# Patient Record
Sex: FEMALE | Race: BLACK OR AFRICAN AMERICAN
[De-identification: names, ages, dates, MRNs, and addresses within clinical notes are randomized per-mention and may not be internally consistent; named-entity substitution may affect disease eponyms.]

---

## 2017-04-04 ENCOUNTER — HOSPITAL ENCOUNTER (INPATIENT)
Dept: HOSPITAL 10 - MS4 | Age: 62
LOS: 3 days | Discharge: HOME | DRG: 194 | End: 2017-04-07
Attending: FAMILY MEDICINE | Admitting: FAMILY MEDICINE
Payer: COMMERCIAL

## 2017-04-04 VITALS — RESPIRATION RATE: 16 BRPM | SYSTOLIC BLOOD PRESSURE: 141 MMHG | DIASTOLIC BLOOD PRESSURE: 70 MMHG

## 2017-04-04 VITALS
HEIGHT: 64 IN | BODY MASS INDEX: 34.82 KG/M2 | BODY MASS INDEX: 34.82 KG/M2 | WEIGHT: 203.93 LBS | WEIGHT: 203.93 LBS | HEIGHT: 64 IN

## 2017-04-04 VITALS — DIASTOLIC BLOOD PRESSURE: 71 MMHG | SYSTOLIC BLOOD PRESSURE: 140 MMHG | RESPIRATION RATE: 19 BRPM

## 2017-04-04 VITALS — HEART RATE: 92 BPM

## 2017-04-04 VITALS — SYSTOLIC BLOOD PRESSURE: 156 MMHG | DIASTOLIC BLOOD PRESSURE: 75 MMHG | RESPIRATION RATE: 18 BRPM

## 2017-04-04 VITALS — SYSTOLIC BLOOD PRESSURE: 171 MMHG | HEART RATE: 90 BPM | DIASTOLIC BLOOD PRESSURE: 84 MMHG

## 2017-04-04 VITALS — HEART RATE: 81 BPM

## 2017-04-04 VITALS — HEART RATE: 95 BPM

## 2017-04-04 VITALS — HEART RATE: 102 BPM

## 2017-04-04 VITALS — DIASTOLIC BLOOD PRESSURE: 81 MMHG | RESPIRATION RATE: 18 BRPM | SYSTOLIC BLOOD PRESSURE: 181 MMHG

## 2017-04-04 VITALS — HEART RATE: 88 BPM

## 2017-04-04 VITALS — SYSTOLIC BLOOD PRESSURE: 148 MMHG | RESPIRATION RATE: 20 BRPM | DIASTOLIC BLOOD PRESSURE: 88 MMHG

## 2017-04-04 DIAGNOSIS — J18.9: Primary | ICD-10-CM

## 2017-04-04 DIAGNOSIS — Z79.4: ICD-10-CM

## 2017-04-04 DIAGNOSIS — D63.1: ICD-10-CM

## 2017-04-04 DIAGNOSIS — Z90.5: ICD-10-CM

## 2017-04-04 DIAGNOSIS — E11.22: ICD-10-CM

## 2017-04-04 DIAGNOSIS — Z85.528: ICD-10-CM

## 2017-04-04 DIAGNOSIS — M54.5: ICD-10-CM

## 2017-04-04 DIAGNOSIS — N18.4: ICD-10-CM

## 2017-04-04 DIAGNOSIS — I12.9: ICD-10-CM

## 2017-04-04 LAB
ADD SCAN DIFF: NO
BASOPHILS # BLD AUTO: 0 10^3/UL (ref 0–0.1)
BASOPHILS NFR BLD: 0.4 % (ref 0–2)
CK MB SERPL-MCNC: 1.07 NG/ML (ref 0–2.4)
CK MB SERPL-MCNC: 1.74 NG/ML (ref 0–2.4)
CK SERPL-CCNC: 139 IU/L (ref 23–200)
CK SERPL-CCNC: 185 IU/L (ref 23–200)
EOSINOPHIL # BLD: 0.1 10^3/UL (ref 0–0.5)
EOSINOPHIL NFR BLD: 1.4 % (ref 0–7)
ERYTHROCYTE [DISTWIDTH] IN BLOOD BY AUTOMATED COUNT: 13.2 % (ref 11.5–14.5)
HCT VFR BLD CALC: 35.7 % (ref 37–47)
HGB BLD-MCNC: 11 G/DL (ref 12–16)
LYMPHOCYTES # BLD AUTO: 2.4 10^3/UL (ref 0.8–2.9)
LYMPHOCYTES NFR BLD AUTO: 24.2 % (ref 15–51)
MCH RBC QN AUTO: 27.2 PG (ref 29–33)
MCHC RBC AUTO-ENTMCNC: 30.8 G/DL (ref 32–37)
MCV RBC AUTO: 88.4 FL (ref 82–101)
MONOCYTES # BLD: 0.7 10^3/UL (ref 0.3–0.9)
MONOCYTES NFR BLD: 6.7 % (ref 0–11)
NEUTROPHILS # BLD: 6.6 10^3/UL (ref 1.6–7.5)
NEUTROPHILS NFR BLD AUTO: 67 % (ref 39–77)
NRBC # BLD MANUAL: 0 10^3/UL (ref 0–0)
NRBC BLD QL: 0 /100WBC (ref 0–0)
PLATELET # BLD: 226 10^3/UL (ref 140–415)
PMV BLD AUTO: 11.5 FL (ref 7.4–10.4)
RBC # BLD AUTO: 4.04 10^6/UL (ref 4.2–5.4)
TROPONIN I SERPL-MCNC: < 0.012 NG/ML (ref 0–0.12)
TROPONIN I SERPL-MCNC: < 0.012 NG/ML (ref 0–0.12)
WBC # BLD AUTO: 9.9 10^3/UL (ref 4.8–10.8)

## 2017-04-04 PROCEDURE — C9113 INJ PANTOPRAZOLE SODIUM, VIA: HCPCS

## 2017-04-04 PROCEDURE — 80048 BASIC METABOLIC PNL TOTAL CA: CPT

## 2017-04-04 PROCEDURE — 83036 HEMOGLOBIN GLYCOSYLATED A1C: CPT

## 2017-04-04 PROCEDURE — 82553 CREATINE MB FRACTION: CPT

## 2017-04-04 PROCEDURE — 82550 ASSAY OF CK (CPK): CPT

## 2017-04-04 PROCEDURE — 97162 PT EVAL MOD COMPLEX 30 MIN: CPT

## 2017-04-04 PROCEDURE — 94664 DEMO&/EVAL PT USE INHALER: CPT

## 2017-04-04 PROCEDURE — 80076 HEPATIC FUNCTION PANEL: CPT

## 2017-04-04 PROCEDURE — 84484 ASSAY OF TROPONIN QUANT: CPT

## 2017-04-04 PROCEDURE — 82962 GLUCOSE BLOOD TEST: CPT

## 2017-04-04 PROCEDURE — 83735 ASSAY OF MAGNESIUM: CPT

## 2017-04-04 PROCEDURE — 85025 COMPLETE CBC W/AUTO DIFF WBC: CPT

## 2017-04-04 PROCEDURE — 94640 AIRWAY INHALATION TREATMENT: CPT

## 2017-04-04 PROCEDURE — 74176 CT ABD & PELVIS W/O CONTRAST: CPT

## 2017-04-04 PROCEDURE — 93005 ELECTROCARDIOGRAM TRACING: CPT

## 2017-04-04 RX ADMIN — MORPHINE SULFATE PRN MG: 2 INJECTION, SOLUTION INTRAMUSCULAR; INTRAVENOUS at 18:54

## 2017-04-04 RX ADMIN — OMEGA-3 FATTY ACIDS CAP DELAYED RELEASE 1000 MG SCH MG: 1000 CAPSULE DELAYED RELEASE at 09:00

## 2017-04-04 RX ADMIN — MORPHINE SULFATE PRN MG: 2 INJECTION, SOLUTION INTRAMUSCULAR; INTRAVENOUS at 14:57

## 2017-04-04 RX ADMIN — IPRATROPIUM BROMIDE AND ALBUTEROL SULFATE SCH ML: .5; 3 SOLUTION RESPIRATORY (INHALATION) at 09:39

## 2017-04-04 RX ADMIN — POLYETHYLENE GLYCOL 3350 SCH GM: 17 POWDER, FOR SOLUTION ORAL at 09:00

## 2017-04-04 RX ADMIN — DIPHENHYDRAMINE HYDROCHLORIDE PRN MG: 50 INJECTION, SOLUTION INTRAMUSCULAR; INTRAVENOUS at 10:31

## 2017-04-04 RX ADMIN — MORPHINE SULFATE PRN MG: 2 INJECTION, SOLUTION INTRAMUSCULAR; INTRAVENOUS at 05:54

## 2017-04-04 RX ADMIN — FOLIC ACID SCH MLS/HR: 5 INJECTION, SOLUTION INTRAMUSCULAR; INTRAVENOUS; SUBCUTANEOUS at 12:56

## 2017-04-04 RX ADMIN — IPRATROPIUM BROMIDE AND ALBUTEROL SULFATE SCH ML: .5; 3 SOLUTION RESPIRATORY (INHALATION) at 15:36

## 2017-04-04 RX ADMIN — HYDRALAZINE HYDROCHLORIDE PRN MG: 20 INJECTION INTRAMUSCULAR; INTRAVENOUS at 11:32

## 2017-04-04 RX ADMIN — IPRATROPIUM BROMIDE AND ALBUTEROL SULFATE SCH ML: .5; 3 SOLUTION RESPIRATORY (INHALATION) at 20:07

## 2017-04-04 RX ADMIN — ENOXAPARIN SODIUM SCH MG: 100 INJECTION SUBCUTANEOUS at 09:04

## 2017-04-04 RX ADMIN — MORPHINE SULFATE PRN MG: 2 INJECTION, SOLUTION INTRAMUSCULAR; INTRAVENOUS at 23:55

## 2017-04-04 RX ADMIN — CEFTRIAXONE SCH MLS/HR: 1 INJECTION, SOLUTION INTRAVENOUS at 07:59

## 2017-04-04 RX ADMIN — DEXAMETHASONE SODIUM PHOSPHATE PRN MG: 10 INJECTION, SOLUTION INTRAMUSCULAR; INTRAVENOUS at 11:32

## 2017-04-04 RX ADMIN — ATORVASTATIN CALCIUM SCH MG: 40 TABLET, FILM COATED ORAL at 20:16

## 2017-04-04 RX ADMIN — PANTOPRAZOLE SODIUM SCH MG: 40 TABLET, DELAYED RELEASE ORAL at 06:06

## 2017-04-04 RX ADMIN — FOLIC ACID SCH MLS/HR: 5 INJECTION, SOLUTION INTRAMUSCULAR; INTRAVENOUS; SUBCUTANEOUS at 20:25

## 2017-04-04 RX ADMIN — IPRATROPIUM BROMIDE AND ALBUTEROL SULFATE SCH ML: .5; 3 SOLUTION RESPIRATORY (INHALATION) at 12:00

## 2017-04-04 RX ADMIN — MORPHINE SULFATE PRN MG: 2 INJECTION, SOLUTION INTRAMUSCULAR; INTRAVENOUS at 10:31

## 2017-04-04 RX ADMIN — INSULIN GLARGINE SCH UNIT: 100 INJECTION, SOLUTION SUBCUTANEOUS at 20:32

## 2017-04-04 RX ADMIN — AMLODIPINE BESYLATE SCH MG: 10 TABLET ORAL at 09:00

## 2017-04-04 RX ADMIN — DIPHENHYDRAMINE HYDROCHLORIDE PRN MG: 50 INJECTION, SOLUTION INTRAMUSCULAR; INTRAVENOUS at 06:01

## 2017-04-04 RX ADMIN — PANTOPRAZOLE SODIUM SCH MG: 40 TABLET, DELAYED RELEASE ORAL at 06:00

## 2017-04-04 RX ADMIN — AZITHROMYCIN MONOHYDRATE SCH MLS/HR: 500 INJECTION, POWDER, LYOPHILIZED, FOR SOLUTION INTRAVENOUS at 08:58

## 2017-04-04 RX ADMIN — POLYETHYLENE GLYCOL 3350 SCH GM: 17 POWDER, FOR SOLUTION ORAL at 20:16

## 2017-04-04 NOTE — HP
Date/Time of Note


Date/Time of Note


DATE: 4/4/17 


TIME: 04:55





Assessment/Plan


VTE Prophylaxis


VTE Prophylaxis Intervention:  anti-embolic stocking





Lines/Catheters


IV Catheter Type (from Nrsg):  PORT A CATH


Urinary Cath still in place:  No





Assessment/Plan


Assessment/Plan


1)  Pneumonia, LLL


- Admit to Med Surg


-  IV Antibiotics





HPI/ROS


Admit Date/Time


Admit Date/Time


Apr 4, 2017 at 02:39





Hx of Present Illness


Patient is transferred to us as a Direct Admit from the ER at [facility] due to 

Insurance reasons. There, she was diagnosed with a LLL Pneumonia. She presented 

with a cough and chest discomfort. Patient tells me that her pain started in 

her mid-back on the left and then wrapped around to the front. The pain is 

constant and is exacerbated by taking moderate to deep breaths. There, she 

received IV fluid bolus, Rocephin and Zithromax. Urine and blood cultures are 

pending. She also required 2 doses of Morphine to control the pain, and since 

the morphine caused some itching, she was also given Benadryl IV.





Of note is that she has a history of Renal Cell Carcinoma and underwent a right 

Nephrectomy about 7 years ago, and a left Partial Nephrectomy 3 years ago. She 

did not require XRT or Chemotherapy





No history of Pneumonia.





Pertinent Labs and Rads from [facility]:


UA: 1.021, 6.0, Glu 3+, Prot 3+, others NEG/WNL


Urine Micro: Epi: 1, RBC: 4, WBC: 20; Hyalin Casts: 0 - 2





INR : 1.0





Trop I < 0.04


BNP: 126





WBC:     10.11 with normal differential


H/H:       11.5/36.8 with normal indices


PLT:       Unable toreport - clumping, possible EDTA induced platelet clumping





Na+:     136


K+        5.0


Cl-        106


CO2:     20


Glu:      314


BUN/Cr:     33/2.27


eGFR:   23


Ca+:     8.7





CXR - compared to 8/13/16


IMPRESSION:  Left costophrenic angle is out of view


                       Opacity in the left lung base obscuring the left cardiac 

border may represent a lingular infiltrate, pneumonia not excluded


                       Right Port-A-Cath tip is in the SVC


                       Bones are intact





ROS


General:  Admits:  Feels generally tired


              Denies:  Fever, Chills, Poor Appetite, Generalized Body Aches


Eyes:  Admits:


          Denies:  Blurry Vision, Double Vision


HENT:  Admits:


           Denies:  Ear Pain/Pressure, Runny/Stuffy Nose, Sore Throat


Cardiovascular:  Admits:  Chest Pain/Pleuritic Chest Pain


                        Denies:  Palpitations, Leg Swelling


Pulmonary:  Admits:  Occasional Cough


                  Denies:  Wheeze, Shortness of Breath


Gastrointestinal:  Admits:


                          Denies:  Abdominal Pain, Nausea, Vomiting, Diarrhea, 

Blood in Stool, Black-Colored Stool


Urogenital:  Admits:  Discomfort with Urination, Urinary Frequency, 


                  Denies:  Blood in Urine


Musculoskeletal:  Admits:


                          Denies:  Joint Pain, Joint Swelling, Muscle Pain


Neurological:  Admits:


                     Denies:  Headache, Dizziness, Numbness, Tingling, Shooting 

Pains


Integumentary:  Admits:


                         Denies:  Rash, Itch


Endocrine:  Admits:  HgbA1c used to be 13. Las she knows it was down to 9





PMH/Family/Social


Past Medical History


Renal Cell Carcinoma - bilateral


DM Type 2





Past Surgical History


Right Nephrectomy ~ 2010


Left Partial Nephrectomy ~ 2014


Past Surgical Hx:  other





Social History


Smoking Status:  Never smoker





Exam/Review of Systems


Vital Signs


Vitals





 Vital Signs








  Date Time  Temp Pulse Resp B/P Pulse Ox O2 Delivery O2 Flow Rate FiO2


 


4/4/17 04:10  81      


 


4/4/17 02:45      Nasal Cannula 2.0 











Exam


Exam


General:  Overweight female. Alert and oriented, in no acute distress. Appears 

tired. Non-toxic.


Eyes:  Sclera White, EOMI


HENT:  Normocephalic/Atraumatic, External Ears/Nose Normal, Moist Mucus 

Membranes


Neck:  Supple, Trachea Midline


Cardiovascular:  Normal Rate, Normal Rhythm, Normal S1 and S2, No Murmur, No 

Extra Sounds


Pulmonary:  Decreased airflow throughout, Normal Respiratory Effort, No Rales, 

Rhonchi or Wheezes, but pain on left side when she takes a deep breath


Gastrointestinal:  Normoactive Bowel Sounds, Soft, Non-Tender/Non-Distended, No 

Hepatosplenomegaly Appreciated, No Pulsatile Masses


Urogenital:  Deferred


Musculoskeletal:  Normal Muscle Bulk and Tone


Neurological:  CN  II - XII Grossly Intact, Non-Focal, Speech Normal


Integumentary:  Normal Moisture and Temperature, Good Turgor, No Jaundice, No 

Rash


Lymphatic:  No Cervical Lymphadenopathy


Psychiatric:  Appropriate Mood and Affect, Good Eye Contact











MARIELA MARSH DO Apr 4, 2017 04:59

## 2017-04-05 VITALS — DIASTOLIC BLOOD PRESSURE: 66 MMHG | SYSTOLIC BLOOD PRESSURE: 132 MMHG | RESPIRATION RATE: 20 BRPM

## 2017-04-05 VITALS — HEART RATE: 98 BPM

## 2017-04-05 VITALS — SYSTOLIC BLOOD PRESSURE: 180 MMHG | DIASTOLIC BLOOD PRESSURE: 79 MMHG | RESPIRATION RATE: 18 BRPM | HEART RATE: 94 BPM

## 2017-04-05 VITALS — SYSTOLIC BLOOD PRESSURE: 155 MMHG | RESPIRATION RATE: 16 BRPM | DIASTOLIC BLOOD PRESSURE: 68 MMHG

## 2017-04-05 VITALS — HEART RATE: 117 BPM

## 2017-04-05 VITALS — HEART RATE: 107 BPM

## 2017-04-05 VITALS — SYSTOLIC BLOOD PRESSURE: 179 MMHG | DIASTOLIC BLOOD PRESSURE: 77 MMHG | RESPIRATION RATE: 20 BRPM

## 2017-04-05 VITALS — SYSTOLIC BLOOD PRESSURE: 148 MMHG | RESPIRATION RATE: 18 BRPM | DIASTOLIC BLOOD PRESSURE: 70 MMHG

## 2017-04-05 VITALS — RESPIRATION RATE: 16 BRPM | DIASTOLIC BLOOD PRESSURE: 73 MMHG | SYSTOLIC BLOOD PRESSURE: 155 MMHG

## 2017-04-05 VITALS — RESPIRATION RATE: 16 BRPM | DIASTOLIC BLOOD PRESSURE: 70 MMHG | SYSTOLIC BLOOD PRESSURE: 145 MMHG

## 2017-04-05 VITALS — HEART RATE: 103 BPM

## 2017-04-05 VITALS — HEART RATE: 90 BPM

## 2017-04-05 VITALS — HEART RATE: 108 BPM

## 2017-04-05 LAB
ADD SCAN DIFF: NO
ALBUMIN SERPL-MCNC: 3.1 G/DL (ref 3.3–4.9)
ALP SERPL-CCNC: 101 IU/L (ref 42–121)
ALT SERPL-CCNC: 21 IU/L (ref 13–69)
ANION GAP SERPL CALC-SCNC: 15 MMOL/L (ref 8–16)
AST SERPL-CCNC: 29 IU/L (ref 15–46)
BASOPHILS # BLD AUTO: 0.1 10^3/UL (ref 0–0.1)
BASOPHILS NFR BLD: 0.5 % (ref 0–2)
BILIRUB DIRECT SERPL-MCNC: 0 MG/DL (ref 0–0.2)
BILIRUB SERPL-MCNC: 0 MG/DL (ref 0.2–1.3)
BUN SERPL-MCNC: 30 MG/DL (ref 7–20)
CALCIUM SERPL-MCNC: 8.6 MG/DL (ref 8.4–10.2)
CHLORIDE SERPL-SCNC: 111 MMOL/L (ref 97–110)
CO2 SERPL-SCNC: 21 MMOL/L (ref 21–31)
CREAT SERPL-MCNC: 2.74 MG/DL (ref 0.44–1)
EOSINOPHIL # BLD: 0.2 10^3/UL (ref 0–0.5)
EOSINOPHIL NFR BLD: 2.5 % (ref 0–7)
ERYTHROCYTE [DISTWIDTH] IN BLOOD BY AUTOMATED COUNT: 13.7 % (ref 11.5–14.5)
GLUCOSE SERPL-MCNC: 153 MG/DL (ref 70–220)
HCT VFR BLD CALC: 33.8 % (ref 37–47)
HGB BLD-MCNC: 9.9 G/DL (ref 12–16)
LYMPHOCYTES # BLD AUTO: 3.2 10^3/UL (ref 0.8–2.9)
LYMPHOCYTES NFR BLD AUTO: 32.9 % (ref 15–51)
MAGNESIUM SERPL-MCNC: 1.6 MG/DL (ref 1.7–2.5)
MCH RBC QN AUTO: 26.7 PG (ref 29–33)
MCHC RBC AUTO-ENTMCNC: 29.3 G/DL (ref 32–37)
MCV RBC AUTO: 91.1 FL (ref 82–101)
MONOCYTES # BLD: 0.7 10^3/UL (ref 0.3–0.9)
MONOCYTES NFR BLD: 7.4 % (ref 0–11)
NEUTROPHILS # BLD: 5.4 10^3/UL (ref 1.6–7.5)
NEUTROPHILS NFR BLD AUTO: 56.5 % (ref 39–77)
NRBC # BLD MANUAL: 0 10^3/UL (ref 0–0)
NRBC BLD QL: 0 /100WBC (ref 0–0)
PLATELET # BLD: 157 10^3/UL (ref 140–415)
PMV BLD AUTO: 10.7 FL (ref 7.4–10.4)
POTASSIUM SERPL-SCNC: 4.3 MMOL/L (ref 3.5–5.1)
PROT SERPL-MCNC: 6.7 G/DL (ref 6.1–8.1)
RBC # BLD AUTO: 3.71 10^6/UL (ref 4.2–5.4)
SODIUM SERPL-SCNC: 143 MMOL/L (ref 135–144)
WBC # BLD AUTO: 9.6 10^3/UL (ref 4.8–10.8)

## 2017-04-05 RX ADMIN — IPRATROPIUM BROMIDE AND ALBUTEROL SULFATE SCH ML: .5; 3 SOLUTION RESPIRATORY (INHALATION) at 07:46

## 2017-04-05 RX ADMIN — IPRATROPIUM BROMIDE AND ALBUTEROL SULFATE SCH ML: .5; 3 SOLUTION RESPIRATORY (INHALATION) at 20:03

## 2017-04-05 RX ADMIN — MORPHINE SULFATE PRN MG: 2 INJECTION, SOLUTION INTRAMUSCULAR; INTRAVENOUS at 04:17

## 2017-04-05 RX ADMIN — FOLIC ACID SCH MLS/HR: 5 INJECTION, SOLUTION INTRAMUSCULAR; INTRAVENOUS; SUBCUTANEOUS at 12:08

## 2017-04-05 RX ADMIN — MORPHINE SULFATE PRN MG: 2 INJECTION, SOLUTION INTRAMUSCULAR; INTRAVENOUS at 18:11

## 2017-04-05 RX ADMIN — POLYETHYLENE GLYCOL 3350 SCH GM: 17 POWDER, FOR SOLUTION ORAL at 08:39

## 2017-04-05 RX ADMIN — ENOXAPARIN SODIUM SCH MG: 100 INJECTION SUBCUTANEOUS at 08:38

## 2017-04-05 RX ADMIN — HEPARIN SODIUM SCH UNIT: 5000 INJECTION, SOLUTION INTRAVENOUS; SUBCUTANEOUS at 20:31

## 2017-04-05 RX ADMIN — PANTOPRAZOLE SODIUM SCH MG: 40 TABLET, DELAYED RELEASE ORAL at 05:06

## 2017-04-05 RX ADMIN — ATORVASTATIN CALCIUM SCH MG: 40 TABLET, FILM COATED ORAL at 20:23

## 2017-04-05 RX ADMIN — OMEGA-3 FATTY ACIDS CAP DELAYED RELEASE 1000 MG SCH MG: 1000 CAPSULE DELAYED RELEASE at 08:39

## 2017-04-05 RX ADMIN — IPRATROPIUM BROMIDE AND ALBUTEROL SULFATE SCH ML: .5; 3 SOLUTION RESPIRATORY (INHALATION) at 13:01

## 2017-04-05 RX ADMIN — MORPHINE SULFATE PRN MG: 2 INJECTION, SOLUTION INTRAMUSCULAR; INTRAVENOUS at 08:29

## 2017-04-05 RX ADMIN — CEFTRIAXONE SCH MLS/HR: 1 INJECTION, SOLUTION INTRAVENOUS at 06:30

## 2017-04-05 RX ADMIN — FOLIC ACID SCH MLS/HR: 5 INJECTION, SOLUTION INTRAMUSCULAR; INTRAVENOUS; SUBCUTANEOUS at 20:19

## 2017-04-05 RX ADMIN — AMLODIPINE BESYLATE SCH MG: 10 TABLET ORAL at 08:39

## 2017-04-05 RX ADMIN — INSULIN GLARGINE SCH UNIT: 100 INJECTION, SOLUTION SUBCUTANEOUS at 20:27

## 2017-04-05 RX ADMIN — AZITHROMYCIN MONOHYDRATE SCH MLS/HR: 500 INJECTION, POWDER, LYOPHILIZED, FOR SOLUTION INTRAVENOUS at 10:57

## 2017-04-05 RX ADMIN — IPRATROPIUM BROMIDE AND ALBUTEROL SULFATE SCH ML: .5; 3 SOLUTION RESPIRATORY (INHALATION) at 16:32

## 2017-04-05 RX ADMIN — MORPHINE SULFATE PRN MG: 2 INJECTION, SOLUTION INTRAMUSCULAR; INTRAVENOUS at 22:02

## 2017-04-05 RX ADMIN — HYDRALAZINE HYDROCHLORIDE PRN MG: 20 INJECTION INTRAMUSCULAR; INTRAVENOUS at 08:29

## 2017-04-05 RX ADMIN — POLYETHYLENE GLYCOL 3350 SCH GM: 17 POWDER, FOR SOLUTION ORAL at 20:23

## 2017-04-05 RX ADMIN — DEXAMETHASONE SODIUM PHOSPHATE PRN MG: 10 INJECTION, SOLUTION INTRAMUSCULAR; INTRAVENOUS at 08:28

## 2017-04-05 RX ADMIN — MORPHINE SULFATE PRN MG: 2 INJECTION, SOLUTION INTRAMUSCULAR; INTRAVENOUS at 14:08

## 2017-04-05 RX ADMIN — FOLIC ACID SCH MLS/HR: 5 INJECTION, SOLUTION INTRAMUSCULAR; INTRAVENOUS; SUBCUTANEOUS at 04:39

## 2017-04-05 NOTE — RADRPT
PROCEDURE:   CT abdomen and pelvis without contrast. 

 

CLINICAL INDICATION:   Left flank pain status post right nephrectomy.

 

TECHNIQUE:   Noncontrast CT examination of the abdomen and pelvis, with axial, sagittal and coronal 
reformatted images.

CTDI: 20.96  mGy and DLP: 1163.06 mGy-cm.

 

COMPARISON:  CT abdomen and pelvis dated 09/13/2016.

 

FINDINGS:

 

CT abdomen:

The lung bases are clear.  The heart size is normal, without pericardial thickening or effusion.  

 

No noncontrast attenuation of the liver is compatible with fatty infiltration. The spleen is normal 
in size and homogeneous in density.  The stomach is partially collapsed, but is grossly unremarkable
.  The pancreas as visualized is normal.  The gallbladder is surgically absent; and the biliary tree
 is otherwise unremarkable and there is no evidence for biliary dilatation.  The adrenal glands are 
symmetric and normal. Surgical changes of right nephrectomy.  The surgical bed is clear.  Small cyst
s likely present in left kidney, some of which are hemorrhagic. No renal calculus or obstructive uro
chnace or mass lesion is seen.

 

The aorta is of normal caliber.  Aortic vascular calcifications are present.  There is no retroperit
anthony lymphadenopathy.  The marline hepatis region is clear.  The bowel and mesentery, as visualized, 
are equally unremarkable.

 

CT pelvis:

The small bowel loops situated within the pelvis are unremarkable.  The pelvic organs are normal.  T
he pelvic sidewalls and inguinal regions are clear.  The sigmoid colon and rectum are all unremarkab
le.  No mass, lymphadenopathy, or free fluid is seen.  No acute inflammation is seen.  The appendix 
is unremarkable.

 

The surrounding osseous structures are remarkable for mild degenerative spondylosis of the spine.  N
o osteolytic or osteoblastic lesion is detected.  

 

IMPRESSION:

1.  Surgically absent right kidney.

2.  Likely small cysts in the left kidney, and consider ultrasound correlation.

3.  Fatty infiltration of the liver.

4.  Surgically absent gallbladder.

 

RPTAT: UU

_____________________________________________ 

Physician Emelia           Date    Time 

Electronically viewed and signed by Physician Emelia on 04/05/2017 01:37 

 

D:  04/05/2017 01:37  T:  04/05/2017 01:37

RS/

## 2017-04-05 NOTE — PN
Date/Time of Note


Date/Time of Note


DATE: 4/5/17 


TIME: 13:28





Assessment/Plan


VTE Prophylaxis


VTE Prophylaxis Intervention:  LMWH





Lines/Catheters


IV Catheter Type (from Nrsg):  PORTACATH


Urinary Cath still in place:  No





Assessment/Plan


Assessment/Plan


1. LLL pneumonia, on rocephin and zithromax


2. DM, on insulin


3. Hypertension, on hydralazine


4. Renal cell carcinoma, right total and left partial nephrectomy


5. 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


6. Normocytic anemia, CKD related


7. DVT prophylaxis:heparin





Subjective


24 Hr Interval Summary


Free Text/Dictation


afebrile


still has cough and chest pain from coughing





Exam/Review of Systems


Vital Signs


Vitals





 Vital Signs








  Date Time  Temp Pulse Resp B/P Pulse Ox O2 Delivery O2 Flow Rate FiO2


 


4/5/17 13:01  88 16  94   21


 


4/5/17 12:12 98.0   132/66    


 


4/5/17 08:00      Room Air  


 


4/4/17 02:45       2.0 














 Intake and Output   


 


 4/4/17 4/4/17 4/5/17





 15:00 23:00 07:00


 


Intake Total  400 ml 1200 ml


 


Balance  400 ml 1200 ml











Exam


Constitutional:  alert, oriented, well developed


Psych:  nl mood/affect, no complaints


Head:  atraumatic, normocephalic


Eyes:  EOMI, PERRL, nl conjunctiva, nl lids


ENMT:  nl external ears & nose, nl lips & teeth, nl nasal mucosa & septum


Neck:  non-tender, supple


Respiratory:  clear to auscultation, normal air movement, 


   No congested cough, No crackles/rales, No diminished breath sounds, No 

intercostal retraction, No labored breathing, No other, No respirations, No 

tactile fremitus, No wheezing


Cardiovascular:  nl pulses, regular rate and rhythm, 


   No S3, No S4, No bruits, No diastolic murmur, No edema, No gallop, No 

irregular rhythm, No jugular venous distention (JVD), No murmurs/extra sounds, 

No other, No rub, No systolic murmur


Gastrointestinal:  nl liver, spleen, non-tender, soft, 


   No ascites, No bowel sounds, No distended, No firm, No hepatomegaly, No mass

, No other, No rebound or guarding, No splenomegaly, No surgical scars, No 

tender


Musculoskeletal:  nl extremities to inspection


Extremities:  normal pulses, 


   No calf tenderness, No clubbing, No cyanosis, No edema, No other, No 

palpable cord, No pitting pedal edema, No tenderness


Neurological:  CNS II-XII intact, nl mental status, nl speech


Skin:  nl turgor


Lymph:  nl lymph nodes





Results


Result Diagram:  


4/5/17 0540                                                                    

            4/5/17 0540





Results 24 hrs





Laboratory Tests








Test


  4/4/17


17:21 4/4/17


20:23 4/5/17


05:40 4/5/17


08:24


 


Bedside Glucose 175   197    131  


 


White Blood Count   9.6   


 


Red Blood Count   3.71  L 


 


Hemoglobin   9.9  L 


 


Hematocrit   33.8  L 


 


Mean Corpuscular Volume   91.1   


 


Mean Corpuscular Hemoglobin   26.7  L 


 


Mean Corpuscular Hemoglobin


Concent 


  


  29.3  L


  


 


 


Red Cell Distribution Width   13.7   


 


Platelet Count   157  # 


 


Mean Platelet Volume   10.7  H 


 


Neutrophils %   56.5   


 


Lymphocytes %   32.9   


 


Monocytes %   7.4   


 


Eosinophils %   2.5   


 


Basophils %   0.5   


 


Nucleated Red Blood Cells %   0.0   


 


Neutrophils #   5.4   


 


Lymphocytes #   3.2  H 


 


Monocytes #   0.7   


 


Eosinophils #   0.2   


 


Basophils #   0.1   


 


Nucleated Red Blood Cells #   0.0   


 


Sodium Level   143   


 


Potassium Level   4.3   


 


Chloride Level   111  H 


 


Carbon Dioxide Level   21   


 


Anion Gap   15   


 


Blood Urea Nitrogen   30  H 


 


Creatinine   2.74  H 


 


Glucose Level   153   


 


Hemoglobin A1c   11.4  H 


 


Calcium Level   8.6   


 


Magnesium Level   1.6  L 


 


Total Bilirubin   0.0  L 


 


Direct Bilirubin   0.00   


 


Indirect Bilirubin   0.0   


 


Aspartate Amino Transf


(AST/SGOT) 


  


  29  


  


 


 


Alanine Aminotransferase


(ALT/SGPT) 


  


  21  


  


 


 


Alkaline Phosphatase   101   


 


Total Protein   6.7   


 


Albumin   3.1  L 














Test


  4/5/17


11:20 


  


  


 


 


Bedside Glucose 161     











Medications


Medications





 Current Medications


Nitroglycerin (Nitroglycerin (Sl Tab) 0.4 Mg) 1 tab Q5M  PRN SL CHEST PAIN;  

Start 4/4/17 at 05:30


Acetaminophen (Tylenol Tab) 650 mg Q6H  PRN PO PAIN LEVEL 1-3 OR FEVER;  Start 4 /4/17 at 05:30


Acetaminophen/ Hydrocodone Bitart (Norco (5/325)) 1 tab Q6H  PRN PO PAIN LEVEL 4

-6;  Start 4/4/17 at 05:30


Morphine Sulfate (morphine) 2 mg Q4H  PRN IV PAIN LEVEL 7-10 Last administered 

on 4/5/17at 08:29; Admin Dose 2 MG;  Start 4/4/17 at 05:30


Enoxaparin Sodium (Lovenox) 30 mg DAILY SC  Last administered on 4/5/17at 08:38

; Admin Dose 30 MG;  Start 4/4/17 at 09:00


Amlodipine Besylate (Norvasc) 10 mg DAILY PO ;  Start 4/4/17 at 09:00


Atorvastatin Calcium (Lipitor) 40 mg HS PO ;  Start 4/4/17 at 21:00


Carvedilol (Coreg) 12.5 mg BID PO ;  Start 4/4/17 at 09:00


Hydralazine HCl (Apresoline) 25 mg TID PO ;  Start 4/4/17 at 09:00


Insulin Glargine (Lantus) 20 unit HS SC  Last administered on 4/4/17at 20:32; 

Admin Dose 20 UNIT;  Start 4/4/17 at 21:00


Fish Oil (Fish Oil) 1,000 mg DAILY PO ;  Start 4/4/17 at 09:00


Polyethylene Glycol (Miralax) 17 gm BID PO ;  Start 4/4/17 at 09:00


Diagnostic Test (Pha) 1 ea 1 ea 02 XX ;  Start 4/5/17 at 02:00


Ceftriaxone Sodium 50 ml @  100 mls/hr Q24H IVPB  Last administered on 4/5/17at 

06:30; Admin Dose 100 MLS/HR;  Start 4/4/17 at 06:30


Azithromycin/ Sodium Chloride (Zithromax/NS) 250 ml @  250 mls/hr DAILY IVPB  

Last administered on 4/5/17at 10:57; Admin Dose 250 MLS/HR;  Start 4/4/17 at 09:

00


Miscellaneous Information 1 ea NOTE XX ;  Start 4/4/17 at 06:30


Glucose (Glutose) 15 gm Q15M  PRN PO DECREASED GLUCOSE;  Start 4/4/17 at 06:30


Glucose (Glutose) 22.5 gm Q15M  PRN PO DECREASED GLUCOSE;  Start 4/4/17 at 06:30


Dextrose (D50w Syringe) 25 ml Q15M  PRN IV DECREASED GLUCOSE;  Start 4/4/17 at 

06:30


Dextrose (D50w Syringe) 50 ml Q15M  PRN IV DECREASED GLUCOSE;  Start 4/4/17 at 

06:30


Glucagon (Glucagen) 1 mg Q15M  PRN IM DECREASED GLUCOSE;  Start 4/4/17 at 06:30


Glucose (Glutose) 15 gm Q15M  PRN BUCCAL DECREASED GLUCOSE;  Start 4/4/17 at 06:

30


Hydralazine HCl (Apresoline) 10 mg Q6H  PRN IV ELEVATED BLOOD PRESSURE Last 

administered on 4/5/17at 08:29; Admin Dose 10 MG;  Start 4/4/17 at 11:00


Ondansetron HCl (Zofran Inj) 4 mg Q4H  PRN IV NAUSEA AND/OR VOMITING Last 

administered on 4/5/17at 08:28; Admin Dose 4 MG;  Start 4/4/17 at 11:00


Pantoprazole 40 mg 40 mg DAILY@06 PO ;  Start 4/5/17 at 06:00


Sodium Chloride (NS) 1,000 ml @  125 mls/hr Q8H IV  Last administered on 4/5/ 17at 12:08; Admin Dose 125 MLS/HR;  Start 4/4/17 at 13:00


Diphenhydramine HCl (Benadryl) 25 mg Q8H  PRN PO ITCHING;  Start 4/4/17 at 15:30











JOSELITO GARCIA MD Apr 5, 2017 13:35

## 2017-04-06 VITALS — DIASTOLIC BLOOD PRESSURE: 58 MMHG | SYSTOLIC BLOOD PRESSURE: 122 MMHG | RESPIRATION RATE: 18 BRPM

## 2017-04-06 VITALS — HEART RATE: 97 BPM | DIASTOLIC BLOOD PRESSURE: 88 MMHG | SYSTOLIC BLOOD PRESSURE: 138 MMHG

## 2017-04-06 VITALS — DIASTOLIC BLOOD PRESSURE: 68 MMHG | SYSTOLIC BLOOD PRESSURE: 160 MMHG | RESPIRATION RATE: 19 BRPM

## 2017-04-06 VITALS — HEART RATE: 99 BPM

## 2017-04-06 VITALS — DIASTOLIC BLOOD PRESSURE: 81 MMHG | RESPIRATION RATE: 19 BRPM | SYSTOLIC BLOOD PRESSURE: 181 MMHG

## 2017-04-06 VITALS — SYSTOLIC BLOOD PRESSURE: 160 MMHG | DIASTOLIC BLOOD PRESSURE: 76 MMHG | RESPIRATION RATE: 19 BRPM

## 2017-04-06 VITALS — HEART RATE: 94 BPM

## 2017-04-06 VITALS — DIASTOLIC BLOOD PRESSURE: 60 MMHG | RESPIRATION RATE: 18 BRPM | SYSTOLIC BLOOD PRESSURE: 139 MMHG

## 2017-04-06 VITALS — RESPIRATION RATE: 18 BRPM | DIASTOLIC BLOOD PRESSURE: 60 MMHG | SYSTOLIC BLOOD PRESSURE: 124 MMHG

## 2017-04-06 VITALS — DIASTOLIC BLOOD PRESSURE: 75 MMHG | SYSTOLIC BLOOD PRESSURE: 135 MMHG

## 2017-04-06 VITALS — HEART RATE: 92 BPM

## 2017-04-06 VITALS — HEART RATE: 102 BPM

## 2017-04-06 VITALS — HEART RATE: 96 BPM

## 2017-04-06 LAB
ADD SCAN DIFF: NO
ANION GAP SERPL CALC-SCNC: 7 MMOL/L (ref 8–16)
BASOPHILS # BLD AUTO: 0 10^3/UL (ref 0–0.1)
BASOPHILS NFR BLD: 0.4 % (ref 0–2)
BUN SERPL-MCNC: 23 MG/DL (ref 7–20)
CALCIUM SERPL-MCNC: 8.1 MG/DL (ref 8.4–10.2)
CHLORIDE SERPL-SCNC: 114 MMOL/L (ref 97–110)
CO2 SERPL-SCNC: 21 MMOL/L (ref 21–31)
CREAT SERPL-MCNC: 2.7 MG/DL (ref 0.44–1)
EOSINOPHIL # BLD: 0.3 10^3/UL (ref 0–0.5)
EOSINOPHIL NFR BLD: 3 % (ref 0–7)
ERYTHROCYTE [DISTWIDTH] IN BLOOD BY AUTOMATED COUNT: 13.9 % (ref 11.5–14.5)
GLUCOSE SERPL-MCNC: 153 MG/DL (ref 70–220)
HCT VFR BLD CALC: 32.9 % (ref 37–47)
HGB BLD-MCNC: 9.8 G/DL (ref 12–16)
LYMPHOCYTES # BLD AUTO: 2.7 10^3/UL (ref 0.8–2.9)
LYMPHOCYTES NFR BLD AUTO: 32.2 % (ref 15–51)
MCH RBC QN AUTO: 27.5 PG (ref 29–33)
MCHC RBC AUTO-ENTMCNC: 29.8 G/DL (ref 32–37)
MCV RBC AUTO: 92.2 FL (ref 82–101)
MONOCYTES # BLD: 0.7 10^3/UL (ref 0.3–0.9)
MONOCYTES NFR BLD: 7.8 % (ref 0–11)
NEUTROPHILS # BLD: 4.7 10^3/UL (ref 1.6–7.5)
NEUTROPHILS NFR BLD AUTO: 56.2 % (ref 39–77)
NRBC # BLD MANUAL: 0 10^3/UL (ref 0–0)
NRBC BLD QL: 0 /100WBC (ref 0–0)
PLATELET # BLD: 242 10^3/UL (ref 140–415)
PMV BLD AUTO: 9.8 FL (ref 7.4–10.4)
POTASSIUM SERPL-SCNC: 4.5 MMOL/L (ref 3.5–5.1)
RBC # BLD AUTO: 3.57 10^6/UL (ref 4.2–5.4)
SODIUM SERPL-SCNC: 137 MMOL/L (ref 135–144)
WBC # BLD AUTO: 8.4 10^3/UL (ref 4.8–10.8)

## 2017-04-06 RX ADMIN — IPRATROPIUM BROMIDE AND ALBUTEROL SULFATE SCH ML: .5; 3 SOLUTION RESPIRATORY (INHALATION) at 21:20

## 2017-04-06 RX ADMIN — IPRATROPIUM BROMIDE AND ALBUTEROL SULFATE SCH ML: .5; 3 SOLUTION RESPIRATORY (INHALATION) at 13:45

## 2017-04-06 RX ADMIN — MORPHINE SULFATE PRN MG: 2 INJECTION, SOLUTION INTRAMUSCULAR; INTRAVENOUS at 13:12

## 2017-04-06 RX ADMIN — FOLIC ACID SCH MLS/HR: 5 INJECTION, SOLUTION INTRAMUSCULAR; INTRAVENOUS; SUBCUTANEOUS at 04:22

## 2017-04-06 RX ADMIN — AZITHROMYCIN MONOHYDRATE SCH MLS/HR: 500 INJECTION, POWDER, LYOPHILIZED, FOR SOLUTION INTRAVENOUS at 08:09

## 2017-04-06 RX ADMIN — DEXAMETHASONE SODIUM PHOSPHATE PRN MG: 10 INJECTION, SOLUTION INTRAMUSCULAR; INTRAVENOUS at 17:45

## 2017-04-06 RX ADMIN — MORPHINE SULFATE PRN MG: 2 INJECTION, SOLUTION INTRAMUSCULAR; INTRAVENOUS at 04:12

## 2017-04-06 RX ADMIN — POLYETHYLENE GLYCOL 3350 SCH GM: 17 POWDER, FOR SOLUTION ORAL at 20:12

## 2017-04-06 RX ADMIN — IPRATROPIUM BROMIDE AND ALBUTEROL SULFATE SCH ML: .5; 3 SOLUTION RESPIRATORY (INHALATION) at 17:24

## 2017-04-06 RX ADMIN — MORPHINE SULFATE PRN MG: 2 INJECTION, SOLUTION INTRAMUSCULAR; INTRAVENOUS at 21:46

## 2017-04-06 RX ADMIN — CEFTRIAXONE SCH MLS/HR: 1 INJECTION, SOLUTION INTRAVENOUS at 05:47

## 2017-04-06 RX ADMIN — IPRATROPIUM BROMIDE AND ALBUTEROL SULFATE SCH ML: .5; 3 SOLUTION RESPIRATORY (INHALATION) at 08:41

## 2017-04-06 RX ADMIN — PANTOPRAZOLE SODIUM SCH MG: 40 TABLET, DELAYED RELEASE ORAL at 04:22

## 2017-04-06 RX ADMIN — INSULIN GLARGINE SCH UNIT: 100 INJECTION, SOLUTION SUBCUTANEOUS at 20:18

## 2017-04-06 RX ADMIN — OMEGA-3 FATTY ACIDS CAP DELAYED RELEASE 1000 MG SCH MG: 1000 CAPSULE DELAYED RELEASE at 08:09

## 2017-04-06 RX ADMIN — MORPHINE SULFATE PRN MG: 2 INJECTION, SOLUTION INTRAMUSCULAR; INTRAVENOUS at 17:45

## 2017-04-06 RX ADMIN — ATORVASTATIN CALCIUM SCH MG: 40 TABLET, FILM COATED ORAL at 20:12

## 2017-04-06 RX ADMIN — HEPARIN SODIUM SCH UNIT: 5000 INJECTION, SOLUTION INTRAVENOUS; SUBCUTANEOUS at 20:18

## 2017-04-06 RX ADMIN — FOLIC ACID SCH MLS/HR: 5 INJECTION, SOLUTION INTRAMUSCULAR; INTRAVENOUS; SUBCUTANEOUS at 13:27

## 2017-04-06 RX ADMIN — FOLIC ACID SCH MLS/HR: 5 INJECTION, SOLUTION INTRAMUSCULAR; INTRAVENOUS; SUBCUTANEOUS at 20:12

## 2017-04-06 RX ADMIN — HEPARIN SODIUM SCH UNIT: 5000 INJECTION, SOLUTION INTRAVENOUS; SUBCUTANEOUS at 08:07

## 2017-04-06 RX ADMIN — AMLODIPINE BESYLATE SCH MG: 10 TABLET ORAL at 08:02

## 2017-04-06 RX ADMIN — POLYETHYLENE GLYCOL 3350 SCH GM: 17 POWDER, FOR SOLUTION ORAL at 08:10

## 2017-04-06 NOTE — PN
Date/Time of Note


Date/Time of Note


DATE: 4/6/17 


TIME: 14:53





Assessment/Plan


VTE Prophylaxis


VTE Prophylaxis Intervention:  LMWH





Lines/Catheters


IV Catheter Type (from Nrsg):  Central Line


Central line still needed:  Yes


Urinary Cath still in place:  No





Assessment/Plan


Assessment/Plan


1. LLL pneumonia, on rocephin and zithromax


2. DM, on insulin


3. Hypertension, on hydralazine


4. Renal cell carcinoma, right total and left partial nephrectomy


5. 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


6. Normocytic anemia, CKD related


7. Left lower back pain, muscular, PT and pain management


8. DVT prophylaxis:heparin





Subjective


24 Hr Interval Summary


Free Text/Dictation


left lower back pain extends to abdomen in the same level. No nausea or vomiting

, no diarrhea





Exam/Review of Systems


Vital Signs


Vitals





 Vital Signs








  Date Time  Temp Pulse Resp B/P Pulse Ox O2 Delivery O2 Flow Rate FiO2


 


4/6/17 13:39  97  138/88    


 


4/6/17 12:04 99.0  19  98   


 


4/6/17 08:41        21


 


4/5/17 08:00      Room Air  


 


4/4/17 02:45       2.0 














 Intake and Output   


 


 4/5/17 4/5/17 4/6/17





 15:00 23:00 07:00


 


Intake Total 300 ml 300 ml 1425 ml


 


Balance 300 ml 300 ml 1425 ml











Exam


Constitutional:  alert, obese, oriented, well developed


Psych:  nl mood/affect, no complaints


Head:  atraumatic, normocephalic


Eyes:  EOMI, nl conjunctiva, nl lids


ENMT:  nl external ears & nose, nl lips & teeth, nl nasal mucosa & septum


Neck:  non-tender, supple


Respiratory:  clear to auscultation, normal air movement, 


   No congested cough, No crackles/rales, No diminished breath sounds, No 

intercostal retraction, No labored breathing, No other, No respirations, No 

tactile fremitus, No wheezing


Cardiovascular:  nl pulses, regular rate and rhythm, 


   No S3, No S4, No bruits, No diastolic murmur, No edema, No gallop, No 

irregular rhythm, No jugular venous distention (JVD), No murmurs/extra sounds, 

No other, No rub, No systolic murmur


Gastrointestinal:  nl liver, spleen, other (left lower back muscular tenderness)

, soft, 


   No ascites, No bowel sounds, No distended, No firm, No hepatomegaly, No mass

, No rebound or guarding, No splenomegaly


Musculoskeletal:  nl extremities to inspection


Extremities:  normal pulses, 


   No calf tenderness, No clubbing, No cyanosis, No edema, No other, No 

palpable cord, No pitting pedal edema, No tenderness


Neurological:  CNS II-XII intact, nl mental status, nl speech, nl strength


Skin:  nl turgor


Lymph:  nl lymph nodes





Results


Result Diagram:  


4/6/17 0639                                                                    

            4/6/17 0639





Results 24 hrs





Laboratory Tests








Test


  4/5/17


17:05 4/5/17


20:26 4/6/17


06:39 4/6/17


07:46


 


Bedside Glucose 234  H 128    148  


 


White Blood Count   8.4   


 


Red Blood Count   3.57  L 


 


Hemoglobin   9.8  L 


 


Hematocrit   32.9  L 


 


Mean Corpuscular Volume   92.2   


 


Mean Corpuscular Hemoglobin   27.5  L 


 


Mean Corpuscular Hemoglobin


Concent 


  


  29.8  L


  


 


 


Red Cell Distribution Width   13.9   


 


Platelet Count   242  # 


 


Mean Platelet Volume   9.8   


 


Neutrophils %   56.2   


 


Lymphocytes %   32.2   


 


Monocytes %   7.8   


 


Eosinophils %   3.0   


 


Basophils %   0.4   


 


Nucleated Red Blood Cells %   0.0   


 


Neutrophils #   4.7   


 


Lymphocytes #   2.7   


 


Monocytes #   0.7   


 


Eosinophils #   0.3   


 


Basophils #   0.0   


 


Nucleated Red Blood Cells #   0.0   


 


Sodium Level   137   


 


Potassium Level   4.5   


 


Chloride Level   114  H 


 


Carbon Dioxide Level   21   


 


Anion Gap   7  #L 


 


Blood Urea Nitrogen   23  H 


 


Creatinine   2.70  H 


 


Glucose Level   153   


 


Calcium Level   8.1  L 














Test


  4/6/17


11:59 


  


  


 


 


Bedside Glucose 178     











Medications


Medications





 Current Medications


Nitroglycerin (Nitroglycerin (Sl Tab) 0.4 Mg) 1 tab Q5M  PRN SL CHEST PAIN;  

Start 4/4/17 at 05:30


Acetaminophen (Tylenol Tab) 650 mg Q6H  PRN PO PAIN LEVEL 1-3 OR FEVER;  Start 4 /4/17 at 05:30


Acetaminophen/ Hydrocodone Bitart (Norco (5/325)) 1 tab Q6H  PRN PO PAIN LEVEL 4

-6;  Start 4/4/17 at 05:30


Morphine Sulfate (morphine) 2 mg Q4H  PRN IV PAIN LEVEL 7-10 Last administered 

on 4/6/17at 13:12; Admin Dose 2 MG;  Start 4/4/17 at 05:30


Amlodipine Besylate (Norvasc) 10 mg DAILY PO  Last administered on 4/6/17at 08:

02; Admin Dose 10 MG;  Start 4/4/17 at 09:00


Atorvastatin Calcium (Lipitor) 40 mg HS PO ;  Start 4/4/17 at 21:00


Carvedilol (Coreg) 12.5 mg BID PO  Last administered on 4/6/17at 08:03; Admin 

Dose 12.5 MG;  Start 4/4/17 at 09:00


Hydralazine HCl (Apresoline) 25 mg TID PO  Last administered on 4/6/17at 13:26; 

Admin Dose 25 MG;  Start 4/4/17 at 09:00


Insulin Glargine (Lantus) 20 unit HS SC  Last administered on 4/4/17at 20:32; 

Admin Dose 20 UNIT;  Start 4/4/17 at 21:00


Fish Oil (Fish Oil) 1,000 mg DAILY PO ;  Start 4/4/17 at 09:00


Polyethylene Glycol (Miralax) 17 gm BID PO ;  Start 4/4/17 at 09:00


Diagnostic Test (Pha) 1 ea 1 ea 02 XX ;  Start 4/5/17 at 02:00


Ceftriaxone Sodium 50 ml @  100 mls/hr Q24H IVPB  Last administered on 4/6/17at 

05:47; Admin Dose 100 MLS/HR;  Start 4/4/17 at 06:30


Azithromycin/ Sodium Chloride (Zithromax/NS) 250 ml @  250 mls/hr DAILY IVPB  

Last administered on 4/6/17at 08:09; Admin Dose 250 MLS/HR;  Start 4/4/17 at 09:

00


Miscellaneous Information 1 ea NOTE XX ;  Start 4/4/17 at 06:30


Glucose (Glutose) 15 gm Q15M  PRN PO DECREASED GLUCOSE;  Start 4/4/17 at 06:30


Glucose (Glutose) 22.5 gm Q15M  PRN PO DECREASED GLUCOSE;  Start 4/4/17 at 06:30


Dextrose (D50w Syringe) 25 ml Q15M  PRN IV DECREASED GLUCOSE;  Start 4/4/17 at 

06:30


Dextrose (D50w Syringe) 50 ml Q15M  PRN IV DECREASED GLUCOSE;  Start 4/4/17 at 

06:30


Glucagon (Glucagen) 1 mg Q15M  PRN IM DECREASED GLUCOSE;  Start 4/4/17 at 06:30


Glucose (Glutose) 15 gm Q15M  PRN BUCCAL DECREASED GLUCOSE;  Start 4/4/17 at 06:

30


Hydralazine HCl (Apresoline) 10 mg Q6H  PRN IV ELEVATED BLOOD PRESSURE Last 

administered on 4/5/17at 08:29; Admin Dose 10 MG;  Start 4/4/17 at 11:00


Ondansetron HCl (Zofran Inj) 4 mg Q4H  PRN IV NAUSEA AND/OR VOMITING Last 

administered on 4/5/17at 08:28; Admin Dose 4 MG;  Start 4/4/17 at 11:00


Pantoprazole 40 mg 40 mg DAILY@06 PO ;  Start 4/5/17 at 06:00


Sodium Chloride (NS) 1,000 ml @  125 mls/hr Q8H IV  Last administered on 4/6/ 17at 13:27; Admin Dose 125 MLS/HR;  Start 4/4/17 at 13:00


Diphenhydramine HCl (Benadryl) 25 mg Q8H  PRN PO ITCHING;  Start 4/4/17 at 15:30


Heparin Sodium (Porcine) (Heparin  (5000 Units/0.5 ml)) 5,000 unit BID SC  Last 

administered on 4/6/17at 08:07; Admin Dose 5,000 UNIT;  Start 4/5/17 at 21:00











JOSELITO GARCIA MD Apr 6, 2017 14:56

## 2017-04-07 VITALS — DIASTOLIC BLOOD PRESSURE: 73 MMHG | SYSTOLIC BLOOD PRESSURE: 162 MMHG | RESPIRATION RATE: 18 BRPM

## 2017-04-07 VITALS — SYSTOLIC BLOOD PRESSURE: 182 MMHG | RESPIRATION RATE: 20 BRPM | DIASTOLIC BLOOD PRESSURE: 84 MMHG

## 2017-04-07 LAB
ADD SCAN DIFF: NO
ANION GAP SERPL CALC-SCNC: 16 MMOL/L (ref 8–16)
BASOPHILS # BLD AUTO: 0 10^3/UL (ref 0–0.1)
BASOPHILS NFR BLD: 0.3 % (ref 0–2)
BUN SERPL-MCNC: 19 MG/DL (ref 7–20)
CALCIUM SERPL-MCNC: 8.4 MG/DL (ref 8.4–10.2)
CHLORIDE SERPL-SCNC: 112 MMOL/L (ref 97–110)
CO2 SERPL-SCNC: 20 MMOL/L (ref 21–31)
CREAT SERPL-MCNC: 2.54 MG/DL (ref 0.44–1)
EOSINOPHIL # BLD: 0.3 10^3/UL (ref 0–0.5)
EOSINOPHIL NFR BLD: 3.1 % (ref 0–7)
ERYTHROCYTE [DISTWIDTH] IN BLOOD BY AUTOMATED COUNT: 13.4 % (ref 11.5–14.5)
GLUCOSE SERPL-MCNC: 176 MG/DL (ref 70–220)
HCT VFR BLD CALC: 31 % (ref 37–47)
HGB BLD-MCNC: 9.6 G/DL (ref 12–16)
LYMPHOCYTES # BLD AUTO: 2.3 10^3/UL (ref 0.8–2.9)
LYMPHOCYTES NFR BLD AUTO: 26.5 % (ref 15–51)
MCH RBC QN AUTO: 27.7 PG (ref 29–33)
MCHC RBC AUTO-ENTMCNC: 31 G/DL (ref 32–37)
MCV RBC AUTO: 89.3 FL (ref 82–101)
MONOCYTES # BLD: 0.8 10^3/UL (ref 0.3–0.9)
MONOCYTES NFR BLD: 8.6 % (ref 0–11)
NEUTROPHILS # BLD: 5.4 10^3/UL (ref 1.6–7.5)
NEUTROPHILS NFR BLD AUTO: 61.3 % (ref 39–77)
NRBC # BLD MANUAL: 0 10^3/UL (ref 0–0)
NRBC BLD QL: 0 /100WBC (ref 0–0)
PLATELET # BLD: 227 10^3/UL (ref 140–415)
PMV BLD AUTO: 10.3 FL (ref 7.4–10.4)
POTASSIUM SERPL-SCNC: 4.6 MMOL/L (ref 3.5–5.1)
RBC # BLD AUTO: 3.47 10^6/UL (ref 4.2–5.4)
SODIUM SERPL-SCNC: 143 MMOL/L (ref 135–144)
WBC # BLD AUTO: 8.8 10^3/UL (ref 4.8–10.8)

## 2017-04-07 RX ADMIN — AZITHROMYCIN MONOHYDRATE SCH MLS/HR: 500 INJECTION, POWDER, LYOPHILIZED, FOR SOLUTION INTRAVENOUS at 09:32

## 2017-04-07 RX ADMIN — OMEGA-3 FATTY ACIDS CAP DELAYED RELEASE 1000 MG SCH MG: 1000 CAPSULE DELAYED RELEASE at 08:32

## 2017-04-07 RX ADMIN — DEXAMETHASONE SODIUM PHOSPHATE PRN MG: 10 INJECTION, SOLUTION INTRAMUSCULAR; INTRAVENOUS at 08:13

## 2017-04-07 RX ADMIN — IPRATROPIUM BROMIDE AND ALBUTEROL SULFATE SCH ML: .5; 3 SOLUTION RESPIRATORY (INHALATION) at 16:00

## 2017-04-07 RX ADMIN — HEPARIN SODIUM SCH UNIT: 5000 INJECTION, SOLUTION INTRAVENOUS; SUBCUTANEOUS at 09:36

## 2017-04-07 RX ADMIN — MORPHINE SULFATE PRN MG: 2 INJECTION, SOLUTION INTRAMUSCULAR; INTRAVENOUS at 08:13

## 2017-04-07 RX ADMIN — FOLIC ACID SCH MLS/HR: 5 INJECTION, SOLUTION INTRAMUSCULAR; INTRAVENOUS; SUBCUTANEOUS at 11:53

## 2017-04-07 RX ADMIN — AMLODIPINE BESYLATE SCH MG: 10 TABLET ORAL at 08:33

## 2017-04-07 RX ADMIN — PANTOPRAZOLE SODIUM SCH MG: 40 TABLET, DELAYED RELEASE ORAL at 06:00

## 2017-04-07 RX ADMIN — POLYETHYLENE GLYCOL 3350 SCH GM: 17 POWDER, FOR SOLUTION ORAL at 09:00

## 2017-04-07 RX ADMIN — IPRATROPIUM BROMIDE AND ALBUTEROL SULFATE SCH ML: .5; 3 SOLUTION RESPIRATORY (INHALATION) at 08:00

## 2017-04-07 RX ADMIN — DEXAMETHASONE SODIUM PHOSPHATE PRN MG: 10 INJECTION, SOLUTION INTRAMUSCULAR; INTRAVENOUS at 12:48

## 2017-04-07 RX ADMIN — FOLIC ACID SCH MLS/HR: 5 INJECTION, SOLUTION INTRAMUSCULAR; INTRAVENOUS; SUBCUTANEOUS at 02:15

## 2017-04-07 RX ADMIN — DEXAMETHASONE SODIUM PHOSPHATE PRN MG: 10 INJECTION, SOLUTION INTRAMUSCULAR; INTRAVENOUS at 02:14

## 2017-04-07 RX ADMIN — MORPHINE SULFATE PRN MG: 2 INJECTION, SOLUTION INTRAMUSCULAR; INTRAVENOUS at 02:14

## 2017-04-07 RX ADMIN — CEFTRIAXONE SCH MLS/HR: 1 INJECTION, SOLUTION INTRAVENOUS at 06:18

## 2017-04-07 RX ADMIN — IPRATROPIUM BROMIDE AND ALBUTEROL SULFATE SCH ML: .5; 3 SOLUTION RESPIRATORY (INHALATION) at 11:33

## 2017-04-07 NOTE — RADRPT
Vent Rate: 93 bpm

RR Interval: 0 msec

AL Interval: 156 msec

QRS Duration: 86 msec

QT Interval: 364 msec

QTC Interval: 452 msec

P-R-T Axis: 55 - 39 - 61 degrees

 

 Normal sinus rhythm

 Cannot rule out Anterior infarct , age undetermined

 Abnormal ECG

 

Electronically Signed By: Bart Garcia

[id number]

## 2017-04-07 NOTE — DS
Date/Time of Note


Date/Time of Note


DATE: 4/7/17 


TIME: 14:56





Discharge Summary


Admission/Discharge Info


Admit Date/Time


Apr 4, 2017 at 02:39


Discharge Date/Time





Final Diagnosis


1. LLL pneumonia, on levaquin


2. DM, on insulin


3. Hypertension, continue antihyperetnsives and follow up with PCP


4. Renal cell carcinoma, right total and left partial nephrectomy


5. CKD, stage 3-4, follow up with PCP


6. Normocytic anemia, CKD related


7. Left lower back pain, muscular, pain management


Patient Condition:  Stable


Hx of Present Illness


Patient is transferred to us as a Direct Admit from the ER at [facility] due to 

Insurance reasons. There, she was diagnosed with a LLL Pneumonia. She presented 

with a cough and chest discomfort. Patient tells me that her pain started in 

her mid-back on the left and then wrapped around to the front. The pain is 

constant and is exacerbated by taking moderate to deep breaths. There, she 

received IV fluid bolus, Rocephin and Zithromax. Urine and blood cultures are 

pending. She also required 2 doses of Morphine to control the pain, and since 

the morphine caused some itching, she was also given Benadryl IV.





Of note is that she has a history of Renal Cell Carcinoma and underwent a right 

Nephrectomy about 7 years ago, and a left Partial Nephrectomy 3 years ago. She 

did not require XRT or Chemotherapy





No history of Pneumonia.





Pertinent Labs and Rads from [facility]:


UA: 1.021, 6.0, Glu 3+, Prot 3+, others NEG/WNL


Urine Micro: Epi: 1, RBC: 4, WBC: 20; Hyalin Casts: 0 - 2





INR : 1.0





Trop I < 0.04


BNP: 126





WBC:     10.11 with normal differential


H/H:       11.5/36.8 with normal indices


PLT:       Unable toreport - clumping, possible EDTA induced platelet clumping





Na+:     136


K+        5.0


Cl-        106


CO2:     20


Glu:      314


BUN/Cr:     33/2.27


eGFR:   23


Ca+:     8.7





CXR - compared to 8/13/16


IMPRESSION:  Left costophrenic angle is out of view


                       Opacity in the left lung base obscuring the left cardiac 

border may represent a lingular infiltrate, pneumonia not excluded


                       Right Port-A-Cath tip is in the SVC


                       Bones are intact


Hospital Course


For pneumonia, she is treated with rocephin and zithromax. Symptoms improved. 

No fever, no cough or shortness of breath. Patient has left lower back pain 

that extends to the front abdomen. Patient will be on norco prn and follow up 

with PCP in office.


Home Meds


Active Scripts


Hydrocodone/Acetaminophen (Norco 5-325 Tablet) 1 Each Tablet, 1 EACH PO Q6H, #

20 TAB


   Prov:JOSELITO GARCIA MD         4/7/17


Ondansetron Hcl* (Zofran*) 4 Mg Tablet, 4 MG PO Q8, #10 TAB


   Prov:JOSELITO GARCIA MD         4/7/17


Levofloxacin* (Levaquin*) 500 Mg Tablet, 500 MG PO DAILY for 5 Days, TAB


   Prov:JOSELITO GARCIA MD         4/7/17


Atorvastatin* (Atorvastatin*) 40 Mg Tablet, 40 MG PO HS for 30 Days, TAB


   Prov:MILEY RAUSCH NP         10/12/16


Sucralfate* (Carafate*) 1 Gm/10 Ml Susp, 1 GM PO QID for 30 Days


   Prov:MILEY RAUSCH NP         10/12/16


Polyethylene Glycol* (Miralax*) 17 Gm Powd.pack, 17 GM PO BID for 30 Days


   Prov:MILEY RAUSCH NP         10/12/16


Hydralazine Hcl* (Hydralazine Hcl*) 25 Mg Tab, 25 MG PO TID, #90 TAB


   Prov:USHA SY MD         11/11/14


Reported Medications


Rosuvastatin Calcium* (Crestor*) 5 Mg Tablet, 5 MG PO QHS, #30 TAB


   4/4/17


Aspirin* (Aspirin* Chew) 81 Mg Tab.chew, 81 MG PO DAILY, TAB.CHEW


   4/4/17


Insulin Aspart* (Novolog Insulin Vial*) 100 U/Ml Vial, 4 UNIT SC TID, VIAL


   10/29/14


Carvedilol* (Coreg*) 12.5 Mg Tablet, 12.5 MG PO BID, TAB


   10/29/14


Pantoprazole* (Protonix*) 40 Mg Tablet.dr, 40 MG PO BID, TAB


   10/23/14


Insulin Glargine* (Lantus*) 100 Unit/Ml Soln, 20 UNIT SC HS, EA


   10/23/14


Omega-3 Fatty Acids/Fish Oil* (Fish Oil *) 1,000 Mg Capsule, 1000 MG PO DAILY, 

CAP


   10/23/14


Amlodipine Besylate* (Norvasc*) 10 Mg Tablet, 10 MG PO DAILY


   11/15/12


Follow-up Plan


PCP in one week


Pending Labs





Laboratory Tests








Test


  4/6/17


16:48 4/6/17


20:11 4/7/17


07:25 4/7/17


07:53


 


Bedside Glucose


  126mg/dL


() 152mg/dL


() 


  164mg/dL


()


 


White Blood Count


  


  


  8.810^3/ul


(4.8-10.8) 


 


 


Red Blood Count


  


  


  3.4710^6/ul


(4.20-5.40) 


 


 


Hemoglobin


  


  


  9.6g/dl


(12.0-16.0) 


 


 


Hematocrit


  


  


  31.0%


(37.0-47.0) 


 


 


Mean Corpuscular Volume


  


  


  89.3fl


(82.0-101.0) 


 


 


Mean Corpuscular Hemoglobin


  


  


  27.7pg


(29.0-33.0) 


 


 


Mean Corpuscular Hemoglobin


Concent 


  


  31.0g/dl


(32.0-37.0) 


 


 


Red Cell Distribution Width


  


  


  13.4%


(11.5-14.5) 


 


 


Platelet Count


  


  


  29569^3/UL


(140-415) 


 


 


Mean Platelet Volume


  


  


  10.3fl


(7.4-10.4) 


 


 


Neutrophils %


  


  


  61.3%


(39.0-77.0) 


 


 


Lymphocytes %


  


  


  26.5%


(15.0-51.0) 


 


 


Monocytes %


  


  


  8.6%


(0.0-11.0) 


 


 


Eosinophils %   3.1% (0.0-7.0)  


 


Basophils %   0.3% (0.0-2.0)  


 


Nucleated Red Blood Cells %


  


  


  0.0/100WBC


(0.0-0.0) 


 


 


Neutrophils #


  


  


  5.410^3/ul


(1.6-7.5) 


 


 


Lymphocytes #


  


  


  2.310^3/ul


(0.8-2.9) 


 


 


Monocytes #


  


  


  0.810^3/ul


(0.3-0.9) 


 


 


Eosinophils #


  


  


  0.310^3/ul


(0.0-0.5) 


 


 


Basophils #


  


  


  0.010^3/ul


(0.0-0.1) 


 


 


Nucleated Red Blood Cells #


  


  


  0.010^3/ul


(0.0-0.0) 


 


 


Sodium Level


  


  


  143mmol/L


(135-144) 


 


 


Potassium Level


  


  


  4.6mmol/L


(3.5-5.1) 


 


 


Chloride Level


  


  


  112mmol/L


() 


 


 


Carbon Dioxide Level


  


  


  20mmol/L


(21-31) 


 


 


Anion Gap   16 (8-16)  


 


Blood Urea Nitrogen   19mg/dl (7-20)  


 


Creatinine


  


  


  2.54mg/dl


(0.44-1.00) 


 


 


Glucose Level


  


  


  176mg/dl


() 


 


 


Calcium Level


  


  


  8.4mg/dl


(8.4-10.2) 


 














Test


  4/7/17


11:52 


  


  


 


 


Bedside Glucose


  106mg/dL


() 


  


  


 

















JOSELITO GARCIA MD Apr 7, 2017 14:59

## 2017-04-09 ENCOUNTER — HOSPITAL ENCOUNTER (INPATIENT)
Dept: HOSPITAL 10 - TEL | Age: 62
LOS: 2 days | Discharge: HOME | DRG: 313 | End: 2017-04-11
Attending: FAMILY MEDICINE | Admitting: FAMILY MEDICINE
Payer: COMMERCIAL

## 2017-04-09 VITALS
WEIGHT: 217.82 LBS | BODY MASS INDEX: 36.29 KG/M2 | BODY MASS INDEX: 36.29 KG/M2 | HEIGHT: 65 IN | WEIGHT: 217.82 LBS | HEIGHT: 65 IN

## 2017-04-09 DIAGNOSIS — Z98.61: ICD-10-CM

## 2017-04-09 DIAGNOSIS — Z79.02: ICD-10-CM

## 2017-04-09 DIAGNOSIS — I12.9: ICD-10-CM

## 2017-04-09 DIAGNOSIS — N18.9: ICD-10-CM

## 2017-04-09 DIAGNOSIS — I25.2: ICD-10-CM

## 2017-04-09 DIAGNOSIS — E66.01: ICD-10-CM

## 2017-04-09 DIAGNOSIS — Z90.5: ICD-10-CM

## 2017-04-09 DIAGNOSIS — Z85.528: ICD-10-CM

## 2017-04-09 DIAGNOSIS — E78.5: ICD-10-CM

## 2017-04-09 DIAGNOSIS — I25.10: ICD-10-CM

## 2017-04-09 DIAGNOSIS — E11.22: ICD-10-CM

## 2017-04-09 DIAGNOSIS — R07.9: Primary | ICD-10-CM

## 2017-04-09 PROCEDURE — 84484 ASSAY OF TROPONIN QUANT: CPT

## 2017-04-09 PROCEDURE — 82962 GLUCOSE BLOOD TEST: CPT

## 2017-04-09 PROCEDURE — 71010: CPT

## 2017-04-09 PROCEDURE — 93005 ELECTROCARDIOGRAM TRACING: CPT

## 2017-04-09 PROCEDURE — 80061 LIPID PANEL: CPT

## 2017-04-09 PROCEDURE — 80053 COMPREHEN METABOLIC PANEL: CPT

## 2017-04-09 PROCEDURE — 80048 BASIC METABOLIC PNL TOTAL CA: CPT

## 2017-04-09 PROCEDURE — 83735 ASSAY OF MAGNESIUM: CPT

## 2017-04-09 PROCEDURE — A9505 TL201 THALLIUM: HCPCS

## 2017-04-09 PROCEDURE — A9500 TC99M SESTAMIBI: HCPCS

## 2017-04-09 PROCEDURE — 85025 COMPLETE CBC W/AUTO DIFF WBC: CPT

## 2017-04-09 PROCEDURE — 78452 HT MUSCLE IMAGE SPECT MULT: CPT

## 2017-04-09 PROCEDURE — 83036 HEMOGLOBIN GLYCOSYLATED A1C: CPT

## 2017-04-09 PROCEDURE — 93306 TTE W/DOPPLER COMPLETE: CPT

## 2017-04-09 PROCEDURE — 93017 CV STRESS TEST TRACING ONLY: CPT

## 2017-04-10 VITALS — HEART RATE: 75 BPM | DIASTOLIC BLOOD PRESSURE: 78 MMHG | RESPIRATION RATE: 20 BRPM | SYSTOLIC BLOOD PRESSURE: 148 MMHG

## 2017-04-10 VITALS — SYSTOLIC BLOOD PRESSURE: 186 MMHG | RESPIRATION RATE: 18 BRPM | DIASTOLIC BLOOD PRESSURE: 82 MMHG

## 2017-04-10 VITALS — DIASTOLIC BLOOD PRESSURE: 82 MMHG | RESPIRATION RATE: 18 BRPM | SYSTOLIC BLOOD PRESSURE: 177 MMHG

## 2017-04-10 VITALS — HEART RATE: 62 BPM

## 2017-04-10 VITALS — HEART RATE: 64 BPM

## 2017-04-10 VITALS — HEART RATE: 67 BPM

## 2017-04-10 VITALS — SYSTOLIC BLOOD PRESSURE: 180 MMHG | DIASTOLIC BLOOD PRESSURE: 77 MMHG

## 2017-04-10 VITALS — DIASTOLIC BLOOD PRESSURE: 79 MMHG | RESPIRATION RATE: 18 BRPM | SYSTOLIC BLOOD PRESSURE: 175 MMHG

## 2017-04-10 VITALS — HEART RATE: 72 BPM

## 2017-04-10 VITALS — DIASTOLIC BLOOD PRESSURE: 69 MMHG | SYSTOLIC BLOOD PRESSURE: 155 MMHG | RESPIRATION RATE: 18 BRPM

## 2017-04-10 VITALS — SYSTOLIC BLOOD PRESSURE: 152 MMHG | RESPIRATION RATE: 18 BRPM | DIASTOLIC BLOOD PRESSURE: 68 MMHG

## 2017-04-10 VITALS — HEART RATE: 60 BPM

## 2017-04-10 PROCEDURE — C22G1ZZ TOMOGRAPHIC (TOMO) NUCLEAR MEDICINE IMAGING OF MYOCARDIUM USING TECHNETIUM 99M (TC-99M): ICD-10-PCS

## 2017-04-10 PROCEDURE — 3E033HZ INTRODUCTION OF RADIOACTIVE SUBSTANCE INTO PERIPHERAL VEIN, PERCUTANEOUS APPROACH: ICD-10-PCS

## 2017-04-10 PROCEDURE — 4A02XM4 MEASUREMENT OF CARDIAC TOTAL ACTIVITY, EXTERNAL APPROACH: ICD-10-PCS

## 2017-04-10 RX ADMIN — FOLIC ACID SCH MLS/HR: 5 INJECTION, SOLUTION INTRAMUSCULAR; INTRAVENOUS; SUBCUTANEOUS at 15:44

## 2017-04-10 RX ADMIN — FOLIC ACID SCH MLS/HR: 5 INJECTION, SOLUTION INTRAMUSCULAR; INTRAVENOUS; SUBCUTANEOUS at 11:54

## 2017-04-10 RX ADMIN — ASPIRIN SCH MG: 81 TABLET, COATED ORAL at 15:45

## 2017-04-10 RX ADMIN — ATORVASTATIN CALCIUM SCH MG: 40 TABLET, FILM COATED ORAL at 21:00

## 2017-04-10 RX ADMIN — FOLIC ACID SCH MLS/HR: 5 INJECTION, SOLUTION INTRAMUSCULAR; INTRAVENOUS; SUBCUTANEOUS at 03:31

## 2017-04-10 RX ADMIN — SUCRALFATE SCH GM: 1 SUSPENSION ORAL at 13:28

## 2017-04-10 RX ADMIN — CARBAMIDE PEROXIDE 6.5% OTIC SOLN SCH DROP: 6.5 SOLUTION at 21:00

## 2017-04-10 RX ADMIN — POLYETHYLENE GLYCOL 3350 SCH GM: 17 POWDER, FOR SOLUTION ORAL at 08:32

## 2017-04-10 RX ADMIN — OMEGA-3 FATTY ACIDS CAP DELAYED RELEASE 1000 MG SCH MG: 1000 CAPSULE DELAYED RELEASE at 08:32

## 2017-04-10 RX ADMIN — DEXAMETHASONE SODIUM PHOSPHATE PRN MG: 10 INJECTION, SOLUTION INTRAMUSCULAR; INTRAVENOUS at 22:11

## 2017-04-10 RX ADMIN — AMLODIPINE BESYLATE SCH MG: 10 TABLET ORAL at 09:36

## 2017-04-10 RX ADMIN — DEXAMETHASONE SODIUM PHOSPHATE PRN MG: 10 INJECTION, SOLUTION INTRAMUSCULAR; INTRAVENOUS at 02:40

## 2017-04-10 RX ADMIN — POLYETHYLENE GLYCOL 3350 SCH GM: 17 POWDER, FOR SOLUTION ORAL at 21:00

## 2017-04-10 RX ADMIN — FOLIC ACID SCH MLS/HR: 5 INJECTION, SOLUTION INTRAMUSCULAR; INTRAVENOUS; SUBCUTANEOUS at 21:54

## 2017-04-10 RX ADMIN — SUCRALFATE SCH GM: 1 SUSPENSION ORAL at 17:00

## 2017-04-10 RX ADMIN — DEXAMETHASONE SODIUM PHOSPHATE PRN MG: 10 INJECTION, SOLUTION INTRAMUSCULAR; INTRAVENOUS at 13:29

## 2017-04-10 RX ADMIN — SUCRALFATE SCH GM: 1 SUSPENSION ORAL at 08:32

## 2017-04-10 RX ADMIN — AMLODIPINE BESYLATE SCH MG: 10 TABLET ORAL at 08:32

## 2017-04-10 RX ADMIN — SUCRALFATE SCH GM: 1 SUSPENSION ORAL at 22:19

## 2017-04-10 NOTE — PN
Date/Time of Note


Date/Time of Note


DATE: 4/10/17 


TIME: 15:01





Assessment/Plan


VTE Prophylaxis


VTE Prophylaxis Intervention:  LMWH





Lines/Catheters


IV Catheter Type (from [facility]):  PORTACATH


Urinary Cath still in place:  No





Assessment/Plan


Chief Complaint/Hosp Course


Assessment and plan





1. Chest pain. Rule out ACS. Follow-up until troponins. Echocardiogram ordered. 

We'll get cardiologist to follow. Tentative plan for stress test.





2. History of CAD. Continue on antiplatelet therapy. Continue on statin 

medication.





3. Dyslipidemia. Follow up on fasting lipid panel. Continue on statin 

medication for now.





4. Essential hypertension. Continue antihypertensives and adjust needed





5. Diabetes. Follow up on A1c. Continue insulin regimen for now





6. Obesity. Weight reduction advised





Disposition and plan: Cardiologist follow. Tentative plan for stress test. We'

ll follow-up





Discussed plan of care with Dr. Bragg


Problems:  





Subjective


24 Hr Interval Summary


Free Text/Dictation


Still reports having some chest "squeezing" gradient left-sided arm.





Exam/Review of Systems


Vital Signs


Vitals





 Vital Signs








  Date Time  Temp Pulse Resp B/P Pulse Ox O2 Delivery O2 Flow Rate FiO2


 


4/10/17 12:00  60      


 


4/10/17 11:40 98.8  18 155/69 100   


 


4/10/17 08:00      Nasal Cannula 2.0 














 Intake and Output   


 


 4/9/17 4/9/17 4/10/17





 14:59 22:59 06:59


 


Intake Total   300 ml


 


Balance   300 ml











Exam


General: Morbidly obese. Minimal distress reported some chest discomfort


Eyes: pupils equal round, Anicteric sclera


Neck: Supple nontender, no JVD


Cardiac: S1, S2 auscultated, regular rhythm and rate


Pulmonary: No coarse rhonchi or breathing auscultated


GI: Abdomen soft nontender nondistended, bowel sounds active


Extremities: No edema bilateral lower extremities


Skin: Clean dry and intact


Neurologic: Alert to person place and time and situation





Results


Results 24 hrs





Laboratory Tests








Test


  4/10/17


01:34 4/10/17


02:15 4/10/17


06:23 4/10/17


06:45


 


Bedside Glucose 143    142   


 


Troponin I  < 0.012    0.017  


 


Magnesium Level    1.5  L














Test


  4/10/17


08:11 4/10/17


12:26 


  


 


 


Bedside Glucose 127   148    











Medications


Medications





 Current Medications


Morphine Sulfate (morphine) 4 mg Q4H  PRN IV PAIN Last administered on 4/10/

17at 08:22; Admin Dose 4 MG;  Start 4/10/17 at 02:00


Ondansetron HCl (Zofran Inj) 4 mg Q4H  PRN IV NAUSEA AND/OR VOMITING Last 

administered on 4/10/17at 13:29; Admin Dose 4 MG;  Start 4/10/17 at 02:00


Nitroglycerin (Nitroglycerin (Sl Tab) 0.4 Mg) 1 tab Q5M  PRN SL ANGINA;  Start 4

/10/17 at 02:00


Insulin Aspart NOVOLOG *MILD* ALGORI... Q4 SC ;  Start 4/10/17 at 05:00


Sodium Chloride (NS) 1,000 ml @  100 mls/hr Q10H IV  Last administered on 4/10/

17at 03:31; Admin Dose 100 MLS/HR;  Start 4/10/17 at 01:54


Acetaminophen (Tylenol Tab) 650 mg Q6H  PRN PO PAIN LEVEL 1-3 OR FEVER;  Start 4

/10/17 at 02:00


Acetaminophen/ Hydrocodone Bitart (Norco (5/325)) 1 tab Q6H  PRN PO PAIN LEVEL 4

-6;  Start 4/10/17 at 02:00


Amlodipine Besylate (Norvasc) 10 mg DAILY PO  Last administered on 4/10/17at 09:

36; Admin Dose 10 MG;  Start 4/10/17 at 09:00


Atorvastatin Calcium (Lipitor) 40 mg HS PO ;  Start 4/10/17 at 21:00


Carvedilol (Coreg) 12.5 mg BID PO  Last administered on 4/10/17at 09:36; Admin 

Dose 12.5 MG;  Start 4/10/17 at 09:00


Hydralazine HCl (Apresoline) 25 mg TID PO  Last administered on 4/10/17at 13:29

; Admin Dose 25 MG;  Start 4/10/17 at 09:00


Fish Oil (Fish Oil) 1,000 mg DAILY PO ;  Start 4/10/17 at 09:00


Polyethylene Glycol (Miralax) 17 gm BID PO ;  Start 4/10/17 at 09:00


Sucralfate (Carafate Susp) 1 gm QID PO  Last administered on 4/10/17at 13:28; 

Admin Dose 1 GM;  Start 4/10/17 at 09:00


Miscellaneous Information 1 ea NOTE XX ;  Start 4/10/17 at 02:30


Glucose (Glutose) 15 gm Q15M  PRN PO DECREASED GLUCOSE;  Start 4/10/17 at 02:30


Glucose (Glutose) 22.5 gm Q15M  PRN PO DECREASED GLUCOSE;  Start 4/10/17 at 02:

30


Dextrose (D50w Syringe) 25 ml Q15M  PRN IV DECREASED GLUCOSE;  Start 4/10/17 at 

02:30


Dextrose (D50w Syringe) 50 ml Q15M  PRN IV DECREASED GLUCOSE;  Start 4/10/17 at 

02:30


Glucagon (Glucagen) 1 mg Q15M  PRN IM DECREASED GLUCOSE;  Start 4/10/17 at 02:30


Glucose (Glutose) 15 gm Q15M  PRN BUCCAL DECREASED GLUCOSE;  Start 4/10/17 at 02

:30


Enoxaparin Sodium (Lovenox) 30 mg DAILY SC ;  Start 4/11/17 at 09:00


Famotidine (Pepcid Iv) 20 mg DAILY IV ;  Start 4/11/17 at 09:00


Aspirin (Halfprin) 81 mg DAILY PO ;  Start 4/10/17 at 15:00;  Status FAY PILLAI Apr 10, 2017 15:05

## 2017-04-10 NOTE — RADRPT
PROCEDURE:   XR Chest. 

 

CLINICAL INDICATION:   Shortness of breath.

 

TECHNIQUE:   Single frontal view. 

 

COMPARISON:   10/09/2016. 

 

FINDINGS:

There is a right internal jugular vein chest port in satisfactory position.  The lungs are clear. 

The heart size is normal.  There is calcification in the aorta consistent with atherosclerosis. 

There is no pleural effusion. 

There is no pneumothorax.   

 

IMPRESSION:

1.  Right chest port.

2.  Atherosclerosis.

3.  Clear lungs.  

 

RPTAT: QQ

_____________________________________________ 

.Leopoldo Shoemaker MD, MD           Date    Time 

Electronically viewed and signed by .Leopoldo Shoemaker MD, MD on 04/10/2017 16:19 

 

D:  04/10/2017 16:19  T:  04/10/2017 16:19

.R/

## 2017-04-10 NOTE — RADRPT
Echocardiogram Report

 

Patient Name:  STELLA BARRIGA   Gender:       Female

MRN:           [id number]              Accession #:  [id number]

Birth Date:    1955          Study Date:   10-Apr-2017

Sonographer:   JOSUÉ [facility]      Location:     [id number]

 

Ref. Physician: LUPILLO ENAMORADO

Quality: Adequate

 

Procedures: Transthoracic echocardiogram with complete 2D, M-Mode, and 

doppler examination.

Indications: Chest Pain.

 

2D/M Mode                          Doppler

Measurement  Value  Normal Ranges  Measurement     Value  Normal Ranges

LVIDd 2D     3.6    3.5 - 5.6 cm   AV Peak Tone     1.2    m/sec

LVIDs 2D     2.7    2.1 - 4.1 cm   AV Peak PG      5.5    mmHg

LVPWd 2D     1.7    0.6 - 1.1 cm   LVOT Peak Tone   0.7    m/sec

IVSd 2D      1.6    0.6 - 1.1 cm   LVOT Peak PG    2.1    mmHg

AoR Diam 2D  2.7    2.0 - 3.7 cm   MV E Peak Tone   0.9    m/sec

EDV 2D       52.8   cm3            MV A Peak Tone   1.0    m/sec

ESV 2D       19.3   cm3            MV E/A          0.9

MV Decel Time   183    msec

MV Decel [place]  5

MV E/A          0.9

 

Findings

Left Ventricle: Normal left ventricular systolic function.  Normal left 

ventricular cavity size.  Moderate concentric left ventricular 

hypertrophy.  Ejection fraction is visually estimated at 65 %.

Right Ventricle: Normal right ventricular size.  Normal right 

ventricular systolic function.

Left Atrium: The left atrium is normal in size.

Right Atrium: The right atrium is normal in size.

Mitral Valve: Mild mitral leaflet calcification.  Mild mitral annular 

calcification.

Aortic Valve: Left coronary cusp appears moderately calcified.  

Trileaflet aortic valve.

Tricuspid Valve: Tricuspid valve not well visualized.  There is trace 

tricuspid regurgitation.

Pulmonic Valve: There is trace pulmonic regurgitation.

Pericardium: Normal pericardium with no significant pericardial effusion.

Aorta: Normal aortic root.

IVC: Dilated inferior vena cava with poor inspiratory collapse 

consistent with elevated right atrial pressures.

 

Conclusions

1.Normal left ventricular systolic function.  Normal left ventricular 

cavity size.  Moderate concentric left ventricular hypertrophy.  

Ejection fraction is visually estimated at 65 %.

2.Mild mitral leaflet calcification.  Mild mitral annular calcification.

3.Left coronary cusp appears moderately calcified (probably related to 

reported history of old endocarditis)  Trileaflet aortic valve.

4.Tricuspid valve not well visualized.  There is trace tricuspid 

regurgitation.

 

Electronically Signed By:

Lupillo Enamorado

10-Apr-2017 16:43:09  -0700

 

Patient Name: STELLA BARRIGA

MRN: [id number]

Study Date: 10-Apr-2017

 

[id number]

## 2017-04-10 NOTE — HP
Date/Time of Note


Date/Time of Note


DATE: 4/10/17 


TIME: 01:50





Assessment/Plan


VTE Prophylaxis


VTE Prophylaxis Intervention:  other (Lovenox)





Assessment/Plan


Assessment/Plan


1)  Chest Pain


-  Admit to Telemetry to complete cardiac work-up with serial Troponins and EKG 

now and in 6 hours.


-  Echocardiogram


-  Cardio Consult in AM


2)  Essential HTN


-  Resume Patient's Home medications


3)  Diabetes Mellitus Type 2, control unknown


-  AccuChek AC and HS when eating, Q 6 hours while NPO


-  Lantus and mild insulin sliding scale.


4)  CKD with Nephrectomy status and history of renal cell carcinoma


-  Monitor fluids and labs closely





HPI/ROS


Admit Date/Time


Admit Date/Time


Apr 10, 2017 at 00:44





Hx of Present Illness


This patient is a transfer from [place] for Insurance reasons. She presented to 

the [place] ER with a complaint of chest pain. Her first set of labs and EKG 

were negative there. Her chest pain had resolved prior to transfer so I 

accepted patient to complete her work-up. Patient has a history of a Heart 

Attack, Hypertension and High Cholesterol and she was discharged 3 days ago 

with Left Lower Lobe Pneumonia. She is still complaining of the same chest pain 

despite being on antibiotics.





ROS


General:  Admits: 


              Denies:  Fever, Chills, Poor Appetite, Generalized Body Aches


Eyes:  Admits:


          Denies:  Blurry Vision, Double Vision


HENT:  Admits:


           Denies:  Ear Pain/Pressure, Runny/Stuffy Nose, Sore Throat


Cardiovascular:  Admits:  Chest Pain - Resolved


                        Denies:  Palpitations, Leg Swelling


Pulmonary:  Admits:  Cough, mild, occasional, Shortness of Breath, occasional, 

worse with exertion


                  Denies:  Wheeze


Gastrointestinal:  Admits:


                          Denies:  Abdominal Pain, Nausea, Vomiting, Diarrhea, 

Blood in Stool, Black-Colored Stool


Urogenital:  Admits:


                  Denies:  Burning with Urination, Urinary Frequency, Blood in 

Urine


Musculoskeletal:  Admits:


                          Denies:  Joint Pain, Joint Swelling, Muscle Pain


Neurological:  Admits:


                     Denies:  Headache, Dizziness, Numbness, Tingling, Shooting 

Pains


Integumentary:  Admits:


                         Denies:  Rash, Itch





PMH/Family/Social


Past Medical History


HTN; Dyslipidemia; Diabetes: Renal Cell CA





Past Surgical History


Right Side Nephrectomy 7 years ago; Left Side Pedrito-Nephrectomy 3 years ago - 

both for Renal Cancer. Patient states they are not sure whether the second was 

from Mets or a new Primary.


Past Surgical Hx:  other





Social History


Smoking Status:  Never smoker


Drug Use:  none





Exam/Review of Systems


Exam


Exam


General:  Alert and oriented, in no acute distress.


Eyes:  Sclera White


HENT:  Normocephalic/Atraumatic, External Ears/Nose Normal, Moist Mucus 

Membranes


Neck:  Supple, Trachea Midline


Cardiovascular:  Normal Rate, Regular Rhythm, Normal S1 and S2, No Murmur, No 

Extra Sounds. Radial pulse +2/4. No pedal Edema.


Pulmonary:  Generally decreased airflow throughout, less in the bases. Normal 

Respiratory Effort.


Gastrointestinal:  Normoactive Bowel Sounds, Soft, Non-Tender/Non-Distended, No 

Hepatosplenomegaly Appreciated, No Pulsatile Masses


Urogenital:  Deferred


Musculoskeletal:  Normal Muscle Bulk and Tone


Neurological:  CN  II - XII Grossly Intact, Non-Focal, Speech Normal


Integumentary:  Normal Moisture and Temperature, Good Turgor, No Jaundice, No 

Rash


Lymphatic:  No Cervical Lymphadenopathy


Psychiatric:  Appropriate Mood and Affect, Good Eye Contact











MARIELA MARSH DO Apr 10, 2017 01:51


Past Surgical Hx:  other





Social History


Smoking Status:  Never smoker


Drug Use:  none





Exam/Review of Systems


Exam


Exam


General: 


Eyes:  Sclera White, EOMI


HENT:  Normocephalic/Atraumatic, External Ears/Nose Normal, Moist Mucus 

Membranes


Neck:  Supple, Trachea Midline


Cardiovascular:  Normal Rate, Regular Rhythm, Normal S1 and S2, No Murmur, No 

Extra Sounds. Radial pulse +2/4. No pedal Edema.


Pulmonary:  Clear to Auscultation Bilaterally, Normal Respiratory Effort, No 

Rales, Rhonchi or Wheezes


Gastrointestinal:  Normoactive Bowel Sounds, Soft, Non-Tender/Non-Distended, No 

Hepatosplenomegaly Appreciated, No Pulsatile Masses


Urogenital:  Deferred


Musculoskeletal:  Normal Muscle Bulk and Tone


Neurological:  CN  II - XII Grossly Intact, Non-Focal, Speech Normal


Integumentary:  Normal Moisture and Temperature, Good Turgor, No Jaundice, No 

Rash


Lymphatic:  No Cervical Lymphadenopathy


Psychiatric:  Appropriate Mood and Affect, Good Eye Contact











MARIELA MARSH DO Apr 10, 2017 01:51

## 2017-04-10 NOTE — CONS
DATE OF ADMISSION: 04/10/2017

DATE OF CONSULTATION:  04/10/2017

 

 

 

TYPE OF CONSULTATION:  Cardiology.

 

REFERRING PHYSICIAN:  Karen العراقي MD.

 

REASON FOR CONSULTATION:  Chest pain.

 

CHIEF COMPLAINT:  Chest pain.

 

HISTORY OF PRESENT ILLNESS:  Thank you for this referral.  History obtained from the patient from re
view of the old chart and discussion with physician and staff.  This is an unfortunate 69-year-old f
emale with history of coronary artery disease status post MI and PCI 4  to 5 years ago, history of c
hronic kidney disease who came to emergency room with the above complaint.  The patient was recently
 admitted about 3 days ago to the hospital with a diagnosis of left lower lobe pneumonia.  She went 
home where she says she has continued to have chest pain.  The pain is pressure and left sided.  She
 could not explain various factors.  She came back to [place] as the transfer facility for the expre
ssed reason.  The patient is nonverbal and cannot give much history to me.

 

PAST MEDICAL HISTORY:  History of renal cell cancer status post apparently right-sided nephrectomy a
nd the left side "partial nephrectomy."  History of hypertension, history of chronic kidney disease,
 history of coronary artery disease, status post MI, status post PCI, hypertension.

 

SOCIAL HISTORY:  Denies any tobacco or drug abuse to me.

 

FAMILY HISTORY:    Family history positive for MI.

 

ALLERGIES:  MULTIPLE INCLUDE IODINE, METOCLOPRAMIDE, ZOSYN, TAZOBACTAM.  REACTIONS UNCLEAR. 

 

MEDICATIONS:  Per medical reconciliation.

 

REVIEW OF SYSTEMS:  As above, was also severe dyspnea on exertion.

 

PHYSICAL EXAMINATION:

VITAL SIGNS:  Temperature 98, heart rate of 60, blood pressure 165/66, respiration rate 18, saturati
ng 100%.

HEENT:  Normocephalic, atraumatic.  Obese gentleman.  Pupils are equal.

CARDIOVASCULAR:  Regular rate and rhythm.  Systolic murmur.

PULMONARY:  With no wheezes anteriorly.

CHEST:  Status post right-sided Port-A-Cath placement.

GASTROINTESTINAL:  Obese, soft.

EXTREMITIES:  With trivial edema.

NEUROLOGIC:  Awake and alert.

PSYCHIATRIC:  Appears to be calm and pleasant.

 

DIAGNOSTIC DATA:  EKG was personally reviewed, showed normal sinus rhythm.  Poor R-wave progression,
 otherwise no evidence of ischemia.

 

LABORATORY:  Shows glucose 143, potassium 4.6, BUN of 19, creatinine 2.54 as of 04/07/2017.  Review 
of the old chart.  Troponin negative x2.  WBC as of 04/07/2017 was 8.8, hemoglobin 9.6, platelets of
 227.

 

ASSESSMENT AND PLAN:

1.  Chest pain, rule acute coronary syndrome.

2.  Recent pneumonia.

3.  Renal insufficiency.

4.  History of hypertension.

5.  Dyslipidemia.

6.  History of obesity.

7.  History of renal cell cancer.

 

RECOMMENDATIONS:  We will continue the patient on aspirin, statin will be continued.  Coreg will be 
continued.  Hydralazine will be continued and increased as needed.  Antibiotic will be deferred to t
 internal medicine team.  Will order a Lexiscan stress and echocardiogram to rule out significant 
obstructive coronary artery disease.

 

 

Dictated By: LUPILLO SELBY/MELISSA

DD:    04/10/2017 14:55:40

DT:    04/10/2017 15:27:22

Conf#: [id number]

DID#:  [id number]

## 2017-04-10 NOTE — TMLRPT
DATE:  04/10/2017

 

 

LEXISCAN STRESS TEST

 

INDICATION:  Chest pain. 

 

DESCRIPTION:  Lexiscan was performed as per protocol.  

 

FINDINGS:  

1.  Baseline EKG showed normal sinus rhythm, poor R-wave progression.  Heart rate at baseline is 62,
 blood pressure 180/87.  

2.  Heart rate at peak stress is 72, blood pressure 159/72.

3.  No significant ischemic ST changes seen.

4.  No significant arrhythmias seen.

 

CONCLUSION:  Completion of Lexiscan stress test as per protocol.  See nuclear medicine result for fi
nal report.

 

 

Dictated By: LUPILLO CROW MD

 

AV/MELISSA

DD:    04/10/2017 16:54:21

DT:    04/10/2017 20:42:23

Conf#: [id number]

DID#:  [id number]

CC: MILEY RAUSCH NP;*End*

## 2017-04-10 NOTE — RADRPT
PROCEDURE:  Lexiscan myocardial perfusion study

 

CLINICAL INDICATION:   61 -year-old patient complaining of chest pain. 

 

TECHNIQUE:   Lexiscan 0.4 mg intravenously separate acquisition gated myocardial perfusion SPECT usi
ng Tc 99m Myoview 31.5 mCi intravenously at stress and Tc-99m Myoview, 10.7 mCi intravenously at res
t was performed using the rest/stress sequence.  Poststress Myoview SPECT images were obtained in th
e supine position. 

 

COMPARISON:   No prior studies. 

 

FINDINGS:

 

Perfusion images reveal no evidence of perfusion defects.

 

Lexiscan post stress gated SPECT images demonstrate no wall motion abnormalities. 

 

IMPRESSION:

 

1.  No evidence of perfusion defects.

 

2.  No wall motion abnormalities.

 

3.  The left ventricle ejection fraction at stress is 67%.

 

A call report was made to Dr. Enamorado at 07:20 p.m. on April 10, 2017.

 

RPTAT: HH

_____________________________________________ 

.Leda Mcgee MD, MD           Date    Time 

Electronically viewed and signed by .Leda Mcgee MD, MD on 04/10/2017 19:20 

 

D:  04/10/2017 19:20  T:  04/10/2017 19:20

.L/

## 2017-04-11 VITALS — SYSTOLIC BLOOD PRESSURE: 163 MMHG | RESPIRATION RATE: 19 BRPM | DIASTOLIC BLOOD PRESSURE: 74 MMHG

## 2017-04-11 VITALS — RESPIRATION RATE: 20 BRPM | SYSTOLIC BLOOD PRESSURE: 164 MMHG | DIASTOLIC BLOOD PRESSURE: 70 MMHG

## 2017-04-11 VITALS — SYSTOLIC BLOOD PRESSURE: 168 MMHG | RESPIRATION RATE: 20 BRPM | DIASTOLIC BLOOD PRESSURE: 80 MMHG

## 2017-04-11 VITALS — HEART RATE: 66 BPM

## 2017-04-11 VITALS — RESPIRATION RATE: 19 BRPM | SYSTOLIC BLOOD PRESSURE: 169 MMHG | DIASTOLIC BLOOD PRESSURE: 72 MMHG

## 2017-04-11 VITALS — HEART RATE: 64 BPM

## 2017-04-11 VITALS — HEART RATE: 68 BPM

## 2017-04-11 LAB
ADD SCAN DIFF: NO
ALBUMIN SERPL-MCNC: 3.2 G/DL (ref 3.3–4.9)
ALBUMIN/GLOB SERPL: 0.88 {RATIO}
ALP SERPL-CCNC: 118 IU/L (ref 42–121)
ALT SERPL-CCNC: 68 IU/L (ref 13–69)
ANION GAP SERPL CALC-SCNC: 7 MMOL/L (ref 8–16)
ANION GAP SERPL CALC-SCNC: 9 MMOL/L (ref 8–16)
AST SERPL-CCNC: 54 IU/L (ref 15–46)
BASOPHILS # BLD AUTO: 0 10^3/UL (ref 0–0.1)
BASOPHILS NFR BLD: 0.4 % (ref 0–2)
BILIRUB DIRECT SERPL-MCNC: 0 MG/DL (ref 0–0.2)
BILIRUB SERPL-MCNC: 0.2 MG/DL (ref 0.2–1.3)
BUN SERPL-MCNC: 22 MG/DL (ref 7–20)
BUN SERPL-MCNC: 23 MG/DL (ref 7–20)
CALCIUM SERPL-MCNC: 8.4 MG/DL (ref 8.4–10.2)
CALCIUM SERPL-MCNC: 8.5 MG/DL (ref 8.4–10.2)
CHLORIDE SERPL-SCNC: 113 MMOL/L (ref 97–110)
CHLORIDE SERPL-SCNC: 114 MMOL/L (ref 97–110)
CHOLEST SERPL-MCNC: 191 MG/DL (ref 100–200)
CHOLEST/HDLC SERPL: 4.6 RATIO
CO2 SERPL-SCNC: 23 MMOL/L (ref 21–31)
CO2 SERPL-SCNC: 23 MMOL/L (ref 21–31)
CREAT SERPL-MCNC: 2.13 MG/DL (ref 0.44–1)
CREAT SERPL-MCNC: 2.25 MG/DL (ref 0.44–1)
EOSINOPHIL # BLD: 0.2 10^3/UL (ref 0–0.5)
EOSINOPHIL NFR BLD: 1.9 % (ref 0–7)
ERYTHROCYTE [DISTWIDTH] IN BLOOD BY AUTOMATED COUNT: 13.5 % (ref 11.5–14.5)
GLOBULIN SER-MCNC: 3.6 G/DL (ref 1.3–3.2)
GLUCOSE SERPL-MCNC: 172 MG/DL (ref 70–220)
GLUCOSE SERPL-MCNC: 181 MG/DL (ref 70–220)
HCT VFR BLD CALC: 32.6 % (ref 37–47)
HDLC SERPL-MCNC: 41 MG/DL (ref 35–98)
HGB BLD-MCNC: 10 G/DL (ref 12–16)
LYMPHOCYTES # BLD AUTO: 1.9 10^3/UL (ref 0.8–2.9)
LYMPHOCYTES NFR BLD AUTO: 21.1 % (ref 15–51)
MAGNESIUM SERPL-MCNC: 1.7 MG/DL (ref 1.7–2.5)
MCH RBC QN AUTO: 27.4 PG (ref 29–33)
MCHC RBC AUTO-ENTMCNC: 30.7 G/DL (ref 32–37)
MCV RBC AUTO: 89.3 FL (ref 82–101)
MONOCYTES # BLD: 0.6 10^3/UL (ref 0.3–0.9)
MONOCYTES NFR BLD: 6.1 % (ref 0–11)
NEUTROPHILS # BLD: 6.3 10^3/UL (ref 1.6–7.5)
NEUTROPHILS NFR BLD AUTO: 70.2 % (ref 39–77)
NRBC # BLD MANUAL: 0 10^3/UL (ref 0–0)
NRBC BLD QL: 0 /100WBC (ref 0–0)
PLATELET # BLD: 312 10^3/UL (ref 140–415)
PMV BLD AUTO: 9.9 FL (ref 7.4–10.4)
POTASSIUM SERPL-SCNC: 4.8 MMOL/L (ref 3.5–5.1)
POTASSIUM SERPL-SCNC: 4.9 MMOL/L (ref 3.5–5.1)
PROT SERPL-MCNC: 6.8 G/DL (ref 6.1–8.1)
RBC # BLD AUTO: 3.65 10^6/UL (ref 4.2–5.4)
SODIUM SERPL-SCNC: 139 MMOL/L (ref 135–144)
SODIUM SERPL-SCNC: 140 MMOL/L (ref 135–144)
TRIGL SERPL-MCNC: 193 MG/DL (ref 0–149)
WBC # BLD AUTO: 9 10^3/UL (ref 4.8–10.8)

## 2017-04-11 RX ADMIN — DEXAMETHASONE SODIUM PHOSPHATE PRN MG: 10 INJECTION, SOLUTION INTRAMUSCULAR; INTRAVENOUS at 14:53

## 2017-04-11 RX ADMIN — ASPIRIN SCH MG: 81 TABLET, COATED ORAL at 09:00

## 2017-04-11 RX ADMIN — SUCRALFATE SCH GM: 1 SUSPENSION ORAL at 14:54

## 2017-04-11 RX ADMIN — FOLIC ACID SCH MLS/HR: 5 INJECTION, SOLUTION INTRAMUSCULAR; INTRAVENOUS; SUBCUTANEOUS at 08:45

## 2017-04-11 RX ADMIN — OMEGA-3 FATTY ACIDS CAP DELAYED RELEASE 1000 MG SCH MG: 1000 CAPSULE DELAYED RELEASE at 09:00

## 2017-04-11 RX ADMIN — DIPHENHYDRAMINE HYDROCHLORIDE SCH MG: 50 INJECTION, SOLUTION INTRAMUSCULAR; INTRAVENOUS at 09:19

## 2017-04-11 RX ADMIN — SUCRALFATE SCH GM: 1 SUSPENSION ORAL at 09:19

## 2017-04-11 RX ADMIN — POLYETHYLENE GLYCOL 3350 SCH GM: 17 POWDER, FOR SOLUTION ORAL at 09:19

## 2017-04-11 RX ADMIN — CARBAMIDE PEROXIDE 6.5% OTIC SOLN SCH DROP: 6.5 SOLUTION at 09:20

## 2017-04-11 RX ADMIN — POLYETHYLENE GLYCOL 3350 SCH GM: 17 POWDER, FOR SOLUTION ORAL at 01:18

## 2017-04-11 RX ADMIN — AMLODIPINE BESYLATE SCH MG: 10 TABLET ORAL at 09:19

## 2017-04-11 RX ADMIN — ATORVASTATIN CALCIUM SCH MG: 40 TABLET, FILM COATED ORAL at 01:19

## 2017-04-11 RX ADMIN — DIPHENHYDRAMINE HYDROCHLORIDE SCH MG: 50 INJECTION, SOLUTION INTRAMUSCULAR; INTRAVENOUS at 11:08

## 2017-04-11 RX ADMIN — DEXAMETHASONE SODIUM PHOSPHATE PRN MG: 10 INJECTION, SOLUTION INTRAMUSCULAR; INTRAVENOUS at 08:45

## 2017-04-11 NOTE — PDOCDIS
Discharge Instructions


DIAGNOSIS


Discharge Diagnosis:  1. chest pain 2. diabetes 3. hypertension 4. obesity





HOME CARE INSTRUCTIONS:


Diet Instructions:  Low Fat /CholesterolSpecial Diet:  1800 trinh





FOLLOW UP/APPOINTMENTS


Appointments


1. Follow up with your primary care provider in one week 


2. Follow up with Dr. Matt Wang in one week


3. Follow up with Dr. Alvaro Enamorado in 2 week s











FAY JENKINS Apr 11, 2017 13:37

## 2017-04-11 NOTE — RADRPT
Vent Rate: 67 bpm

RR Interval: 0 msec

AR Interval: 154 msec

QRS Duration: 86 msec

QT Interval: 414 msec

QTC Interval: 437 msec

P-R-T Axis: 47 - 43 - 58 degrees

 

 Normal sinus rhythm

 Cannot rule out Anterior infarct , age undetermined

 Abnormal ECG

 

Electronically Signed By: Jaime Gamboa

[id number]

## 2017-04-11 NOTE — RADRPT
Vent Rate: 73 bpm

RR Interval: 0 msec

NC Interval: 160 msec

QRS Duration: 80 msec

QT Interval: 410 msec

QTC Interval: 451 msec

P-R-T Axis: 147 - 135 - 145 degrees

 

 *** Suspect arm lead reversal, interpretation assumes no reversal

Sinus Rhythm

 Right axis deviation

 Pulmonary disease pattern

 Abnormal ECG

 

Electronically Signed By: Jaime Gamboa

[id number]

## 2017-04-16 NOTE — DS
Date/Time of Note


Date/Time of Note


DATE: 4/16/17 


TIME: 17:02





Discharge Summary


Admission/Discharge Info


Admit Date/Time


Apr 10, 2017 at 00:44


Discharge Date/Time


Apr 11, 2017 at 15:34


Final Diagnosis


1. Chest pain. 


2. History of CAD. 


3. Dyslipidemia. 


4. Essential hypertension.


5. Diabetes. 


6. Obesity.


Patient Condition:  Stable


Consults


1. Dr. [facility] Course


This is a 61-year-old female with history of CAD, dysrhythmia, hypertension, 

diabetes, who came to [facility] due to reports of chest 

pain.  Due to patient's extensive heart history she was seen by cardiologist.  

She did have serial troponins drawn which were all essentially negative.  She 

additionally had echocardiogram done that did show a have an ejection fraction 

of approximately 65%.  She had a stress test that did show her to have no 

evidence of perfusion defects and ejection fraction of 67% at stress.  Patient 

was ruled out for ACS.  During the course of stay she did improve.  She was 

otherwise optimized medically.  She was continued on statin medication 

antiplatelet therapy for her history of CAD.  She is continued on statin 

medication for dyslipidemia and antihypertensives for hypertension.  She is 

also on insulin for her reported diabetes.  Patient was also noted to be 

morbidly obese and was advised to reduction.  The plan of care was discussed 

with the patient and patient did verbalize her understanding.  On the day of 

discharge patient was in stable condition





Discussed plan of care with Dr. Bragg 





Discharge Process time: 40 minutes


Home Meds


Active Scripts


Azithromycin* (Zithromax* Tri-Sulaiman) 500 Mg Tablet, 500 MG PO DAILY for 3 Days, 

TAB


   Prov:FAY JENKINS         4/11/17


Insulin Aspart* (Novolog Insulin Pen*) 100 Unit/Ml Soln, 6 UNIT SC WITH MEALS 

for 30 Days, EA


   Prov:REGFAY HUTCHINSON         4/11/17


Hydrocodone/Acetaminophen (Norco 5-325 Tablet) 1 Each Tablet, 1 EACH PO Q6H, #

30 TAB


   Prov:REGFAY HUTCHINSON         4/11/17


Insulin Glargine* (Lantus*) 100 Unit/Ml Soln, 22 UNIT SC HS for 30 Days, EA


   Prov:FAY JENKINS         4/11/17


Amlodipine Besylate* (Norvasc*) 10 Mg Tablet, 10 MG PO DAILY for 30 Days


   Prov:FAY JENKINS         4/11/17


Ondansetron Hcl* (Zofran*) 4 Mg Tablet, 4 MG PO Q8, #10 TAB


   Prov:JOSELITO GARCIA MD         4/7/17


Atorvastatin* (Atorvastatin*) 40 Mg Tablet, 40 MG PO HS for 30 Days, TAB


   Prov:MILEY RAUSCH NP         10/12/16


Sucralfate* (Carafate*) 1 Gm/10 Ml Susp, 1 GM PO QID for 30 Days


   Prov:MILEY RAUSCH NP         10/12/16


Polyethylene Glycol* (Miralax*) 17 Gm Powd.pack, 17 GM PO BID for 30 Days


   Prov:MILEY RAUSCH NP         10/12/16


Hydralazine Hcl* (Hydralazine Hcl*) 25 Mg Tab, 25 MG PO TID, #90 TAB


   Prov:USHA SY MD         11/11/14


Reported Medications


Aspirin* (Aspirin* Chew) 81 Mg Tab.chew, 81 MG PO DAILY, TAB.CHEW


   4/4/17


Insulin Aspart* (Novolog Insulin Vial*) 100 U/Ml Vial, 4 UNIT SC TID, VIAL


   10/29/14


Carvedilol* (Coreg*) 12.5 Mg Tablet, 12.5 MG PO BID, TAB


   10/29/14


Pantoprazole* (Protonix*) 40 Mg Tablet.dr, 40 MG PO BID, TAB


   10/23/14


Omega-3 Fatty Acids/Fish Oil* (Fish Oil *) 1,000 Mg Capsule, 1000 MG PO DAILY, 

CAP


   10/23/14


Discontinued Reported Medications


Rosuvastatin Calcium* (Crestor*) 5 Mg Tablet, 5 MG PO QHS, #30 TAB


   4/4/17


Discontinued Scripts


Levofloxacin* (Levaquin*) 500 Mg Tablet, 500 MG PO DAILY for 5 Days, TAB


   Prov:FAY JENKINS         4/11/17


Follow-up Plan


HOME CARE INSTRUCTIONS:


Diet Instructions:  Low Fat /CholesterolSpecial Diet:  1800 trinh





FOLLOW UP/APPOINTMENTS


Appointments


1. Follow up with your primary care provider in one week 


2. Follow up with Dr. Matt Wang in one week


3. Follow up with Dr. Alvaro Enamorado in 2 week s











FAY JENKINS Apr 16, 2017 17:06

## 2017-09-10 ENCOUNTER — HOSPITAL ENCOUNTER (INPATIENT)
Dept: HOSPITAL 54 - ER | Age: 62
LOS: 4 days | Discharge: HOME HEALTH | DRG: 463 | End: 2017-09-14
Attending: INTERNAL MEDICINE | Admitting: INTERNAL MEDICINE
Payer: COMMERCIAL

## 2017-09-10 VITALS — BODY MASS INDEX: 31.32 KG/M2 | WEIGHT: 188 LBS | HEIGHT: 65 IN

## 2017-09-10 DIAGNOSIS — D63.1: ICD-10-CM

## 2017-09-10 DIAGNOSIS — Z90.5: ICD-10-CM

## 2017-09-10 DIAGNOSIS — N10: ICD-10-CM

## 2017-09-10 DIAGNOSIS — N28.1: ICD-10-CM

## 2017-09-10 DIAGNOSIS — N17.0: ICD-10-CM

## 2017-09-10 DIAGNOSIS — I12.9: ICD-10-CM

## 2017-09-10 DIAGNOSIS — N39.0: Primary | ICD-10-CM

## 2017-09-10 DIAGNOSIS — E87.5: ICD-10-CM

## 2017-09-10 DIAGNOSIS — K21.9: ICD-10-CM

## 2017-09-10 DIAGNOSIS — N18.4: ICD-10-CM

## 2017-09-10 DIAGNOSIS — B95.62: ICD-10-CM

## 2017-09-10 DIAGNOSIS — Z87.442: ICD-10-CM

## 2017-09-10 DIAGNOSIS — R91.1: ICD-10-CM

## 2017-09-10 DIAGNOSIS — E78.5: ICD-10-CM

## 2017-09-10 DIAGNOSIS — Z85.528: ICD-10-CM

## 2017-09-10 DIAGNOSIS — E11.22: ICD-10-CM

## 2017-09-10 DIAGNOSIS — E66.9: ICD-10-CM

## 2017-09-10 LAB
ALBUMIN SERPL BCP-MCNC: 3.3 G/DL (ref 3.4–5)
ALP SERPL-CCNC: 134 U/L (ref 46–116)
ALT SERPL W P-5'-P-CCNC: 14 U/L (ref 12–78)
AST SERPL W P-5'-P-CCNC: 12 U/L (ref 15–37)
BASOPHILS # BLD AUTO: 0.1 /CMM (ref 0–0.2)
BASOPHILS NFR BLD AUTO: 0.6 % (ref 0–2)
BILIRUB DIRECT SERPL-MCNC: 0 MG/DL (ref 0–0.2)
BILIRUB SERPL-MCNC: 0.2 MG/DL (ref 0.2–1)
BUN SERPL-MCNC: 47 MG/DL (ref 7–18)
CALCIUM SERPL-MCNC: 8.5 MG/DL (ref 8.5–10.1)
CHLORIDE SERPL-SCNC: 107 MMOL/L (ref 98–107)
CO2 SERPL-SCNC: 22 MMOL/L (ref 21–32)
CREAT SERPL-MCNC: 3.3 MG/DL (ref 0.6–1.3)
EOSINOPHIL # BLD AUTO: 0.2 /CMM (ref 0–0.7)
EOSINOPHIL NFR BLD AUTO: 1.8 % (ref 0–6)
GLUCOSE SERPL-MCNC: 170 MG/DL (ref 74–106)
HCT VFR BLD AUTO: 31 % (ref 33–45)
HGB BLD-MCNC: 10 G/DL (ref 11.5–14.8)
LYMPHOCYTES NFR BLD AUTO: 19.8 % (ref 20–44)
LYMPHOCYTES NFR BLD AUTO: 2.6 /CMM (ref 0.8–4.8)
MCH RBC QN AUTO: 27 PG (ref 26–33)
MCHC RBC AUTO-ENTMCNC: 32 G/DL (ref 31–36)
MCV RBC AUTO: 84 FL (ref 82–100)
MONOCYTES NFR BLD AUTO: 0.7 /CMM (ref 0.1–1.3)
MONOCYTES NFR BLD AUTO: 5.1 % (ref 2–12)
NEUTROPHILS # BLD AUTO: 9.6 /CMM (ref 1.8–8.9)
NEUTROPHILS NFR BLD AUTO: 72.7 % (ref 43–81)
PH UR STRIP: 5.5 [PH] (ref 5–8)
PLATELET # BLD AUTO: 277 /CMM (ref 150–450)
POTASSIUM SERPL-SCNC: 5.2 MMOL/L (ref 3.5–5.1)
PROT SERPL-MCNC: 7.4 G/DL (ref 6.4–8.2)
PROT UR QL STRIP: >=300 MG/DL
RBC # BLD AUTO: 3.67 MIL/UL (ref 4–5.2)
RBC #/AREA URNS HPF: (no result) /HPF (ref 0–2)
RDW COEFFICIENT OF VARIATION: 14.3 (ref 11.5–15)
SODIUM SERPL-SCNC: 140 MMOL/L (ref 136–145)
UROBILINOGEN UR STRIP-MCNC: 0.2 EU/DL
WBC #/AREA URNS HPF: (no result) /HPF (ref 0–3)
WBC NRBC COR # BLD AUTO: 13.2 K/UL (ref 4.3–11)

## 2017-09-10 PROCEDURE — A4606 OXYGEN PROBE USED W OXIMETER: HCPCS

## 2017-09-10 PROCEDURE — Z7610: HCPCS

## 2017-09-10 NOTE — NUR
PER DR. OCONNOR AND PT MARGARET TO ACCESS [facility]. GOOD BLOOD RETURN. LABS 
DRAWN SENT TO LAB.

## 2017-09-10 NOTE — NUR
PT DOES NOT HAVE UPDATED MEDICATION LIST. PT STATES DAUGHTER WILL BRING 
MEDICATIONS TOMORROW MORNING.

## 2017-09-10 NOTE — NUR
PT BIBSELF FROM HOME C/O LEFT ABD CRAMPING PAIN X 3 DAYS. PT AOX3 RR EVEN AND 
UNLABORED. NO SOB NOTED. NAD NOTED. NO NVD AT THIS TIME. PT GOWNED AND PLACED 
ON MONITOR WAITING FOR MD CARLTON. 



URINE COLLECTED. CALLED LAB FOR .

## 2017-09-11 VITALS — DIASTOLIC BLOOD PRESSURE: 69 MMHG | SYSTOLIC BLOOD PRESSURE: 137 MMHG

## 2017-09-11 VITALS — DIASTOLIC BLOOD PRESSURE: 72 MMHG | SYSTOLIC BLOOD PRESSURE: 127 MMHG

## 2017-09-11 VITALS — DIASTOLIC BLOOD PRESSURE: 52 MMHG | SYSTOLIC BLOOD PRESSURE: 125 MMHG

## 2017-09-11 VITALS — DIASTOLIC BLOOD PRESSURE: 79 MMHG | SYSTOLIC BLOOD PRESSURE: 137 MMHG

## 2017-09-11 VITALS — DIASTOLIC BLOOD PRESSURE: 68 MMHG | SYSTOLIC BLOOD PRESSURE: 138 MMHG

## 2017-09-11 VITALS — DIASTOLIC BLOOD PRESSURE: 74 MMHG | SYSTOLIC BLOOD PRESSURE: 137 MMHG

## 2017-09-11 LAB
ALBUMIN SERPL BCP-MCNC: 3 G/DL (ref 3.4–5)
ALP SERPL-CCNC: 130 U/L (ref 46–116)
ALT SERPL W P-5'-P-CCNC: 35 U/L (ref 12–78)
AST SERPL W P-5'-P-CCNC: 37 U/L (ref 15–37)
BASOPHILS # BLD AUTO: 0.1 /CMM (ref 0–0.2)
BASOPHILS NFR BLD AUTO: 0.7 % (ref 0–2)
BILIRUB SERPL-MCNC: 0.3 MG/DL (ref 0.2–1)
BUN SERPL-MCNC: 44 MG/DL (ref 7–18)
CALCIUM SERPL-MCNC: 8.2 MG/DL (ref 8.5–10.1)
CHLORIDE SERPL-SCNC: 113 MMOL/L (ref 98–107)
CHOLEST SERPL-MCNC: 118 MG/DL (ref ?–200)
CO2 SERPL-SCNC: 23 MMOL/L (ref 21–32)
CREAT SERPL-MCNC: 2.9 MG/DL (ref 0.6–1.3)
EOSINOPHIL # BLD AUTO: 0.1 /CMM (ref 0–0.7)
EOSINOPHIL NFR BLD AUTO: 1.7 % (ref 0–6)
GLUCOSE SERPL-MCNC: 124 MG/DL (ref 74–106)
HCT VFR BLD AUTO: 24 % (ref 33–45)
HDLC SERPL-MCNC: 38 MG/DL (ref 40–60)
HGB BLD-MCNC: 7.6 G/DL (ref 11.5–14.8)
LDLC SERPL DIRECT ASSAY-MCNC: 60 MG/DL (ref 0–99)
LYMPHOCYTES NFR BLD AUTO: 2.5 /CMM (ref 0.8–4.8)
LYMPHOCYTES NFR BLD AUTO: 29.5 % (ref 20–44)
MAGNESIUM SERPL-MCNC: 1.7 MG/DL (ref 1.8–2.4)
MCH RBC QN AUTO: 27 PG (ref 26–33)
MCHC RBC AUTO-ENTMCNC: 32 G/DL (ref 31–36)
MCV RBC AUTO: 84 FL (ref 82–100)
MONOCYTES NFR BLD AUTO: 0.6 /CMM (ref 0.1–1.3)
MONOCYTES NFR BLD AUTO: 7.2 % (ref 2–12)
NEUTROPHILS # BLD AUTO: 5.2 /CMM (ref 1.8–8.9)
NEUTROPHILS NFR BLD AUTO: 60.9 % (ref 43–81)
PHOSPHATE SERPL-MCNC: 3.7 MG/DL (ref 2.5–4.9)
PLATELET # BLD AUTO: 194 /CMM (ref 150–450)
POTASSIUM SERPL-SCNC: 5.6 MMOL/L (ref 3.5–5.1)
PROT SERPL-MCNC: 6.9 G/DL (ref 6.4–8.2)
RBC # BLD AUTO: 2.82 MIL/UL (ref 4–5.2)
RDW COEFFICIENT OF VARIATION: 14.8 (ref 11.5–15)
SODIUM SERPL-SCNC: 144 MMOL/L (ref 136–145)
TRIGL SERPL-MCNC: 102 MG/DL (ref 30–150)
WBC NRBC COR # BLD AUTO: 8.5 K/UL (ref 4.3–11)

## 2017-09-11 NOTE — NUR
MS RN NOTES

CHARGE NURSE MIGULE OVERRIDE BENADRYL 50MG IV,PATIENT SOUND ASLEEP AT THIS TIME.WILL 
ADMINISTER ONCE PATIENT IS AWAKE.

## 2017-09-11 NOTE — NUR
MS RN NOTES

RECEIVED ON BED A/O X4,BREATHING REGULAR,SKIN WARM TO THE TOUCH,ORAL TEMP 99.5.ENCOURAGED TO 
INCREASED FLUIDS INTAKE. IVF INFUSING AT 75ML/HR RATE VIA IV PUMP.SITE PATENT RIGHT CHEST 
WALL ROSY CATH.AMBULATE WITH STEADY GAIT.CALL LIGHT IN REACH,NEEDS ANTICIPATED.

## 2017-09-11 NOTE — NUR
MS RN NOTES

C/O GENERALIZED ITCHINESS,OFFERED BENADRYL 50MG PO OPENED BUT REFUSED.SHE PREFERS BENADRYL 
IV.MD HAINES WAS PAGE.MADE AWARE OF PATIENT CONCERN AND HE SAID OK TO GIVEN BENADRYL IV 
50MG Q 6PRN FOR ITCHINESS,NOTED AND CARRIED OUT.

## 2017-09-11 NOTE — NUR
TELE RN CLOSING NOTES 



PT IS IN BED RESTING. NO SIGNS OF SOB OR DISTRESS. ON 1 1/2 L NC BREATHING EVENLY AND 
UNLABORED. IV ACCESS INTACT AND PATENT RUNNING WITH 1/2 NS @ 75 ML/HR. TELE MONITOR SHOWING 
SR 62.  ALL NEEDS WERE ANTICIPATED AND MET. WILL ENDORSE TO DAY SHIFT

## 2017-09-11 NOTE — NUR
MS RN

RECEIVE DON BED, AWAKE,ALERT X1,NOT IN ANY FORM OF DISTRESS, RESPIRATIONS EVEN AND 
UNLABORED,NO SOB NOTED, LUNGS ARE DIMINISH,ABDOMEN SOFT,POSITIVE BOWEL SOUNDS,DENIES PAIN AT 
THIS TIME, WILL MONITOR PATIENT'S CONDITION.

## 2017-09-11 NOTE — NUR
TELE RN INITIAL NOTES 



PT RECEIVED FROM ER. PT ARRIVED VIA KENYA MESSINA AT BEDSIDE. A/O X3 ABLE TO MAKE NEEDS 
KNOWN. NO SIGNS OF SOB OR DISTRESS. BREATHING EVENLY AND UNLABORED ON ROOM AIR. IV ACCESS 
THROUGH ROSY-CATH INTACT AND PATENT WITH IV FLUIDS RUNNING. SEPSIS REASSESSMENT WAS 
COMPLETED.TELE MONITOR SHOWS SR 77. DR. HAINES SAW PT ON THE FLOOR. MED LIST TO BE 
BROUGHT BY DAUGHTER LATER TODAY, PT STATES SHE IS ORIGINALLY ON CPAP AT BEDTIME. BE IS IN 
LOW AND LOCKED POSITION, CALL LIGHT WITHIN REACH. WILL CONTINUE TO MONITOR PT

## 2017-09-12 VITALS — DIASTOLIC BLOOD PRESSURE: 71 MMHG | SYSTOLIC BLOOD PRESSURE: 147 MMHG

## 2017-09-12 VITALS — DIASTOLIC BLOOD PRESSURE: 62 MMHG | SYSTOLIC BLOOD PRESSURE: 106 MMHG

## 2017-09-12 VITALS — DIASTOLIC BLOOD PRESSURE: 63 MMHG | SYSTOLIC BLOOD PRESSURE: 125 MMHG

## 2017-09-12 VITALS — SYSTOLIC BLOOD PRESSURE: 128 MMHG | DIASTOLIC BLOOD PRESSURE: 68 MMHG

## 2017-09-12 LAB
BASOPHILS # BLD AUTO: 0 /CMM (ref 0–0.2)
BASOPHILS NFR BLD AUTO: 0.4 % (ref 0–2)
BUN SERPL-MCNC: 38 MG/DL (ref 7–18)
CALCIUM SERPL-MCNC: 8.4 MG/DL (ref 8.5–10.1)
CHLORIDE SERPL-SCNC: 109 MMOL/L (ref 98–107)
CO2 SERPL-SCNC: 24 MMOL/L (ref 21–32)
CREAT SERPL-MCNC: 3.1 MG/DL (ref 0.6–1.3)
EOSINOPHIL # BLD AUTO: 0.3 /CMM (ref 0–0.7)
EOSINOPHIL NFR BLD AUTO: 3.1 % (ref 0–6)
GLUCOSE SERPL-MCNC: 124 MG/DL (ref 74–106)
HCT VFR BLD AUTO: 32 % (ref 33–45)
HGB BLD-MCNC: 10.2 G/DL (ref 11.5–14.8)
LYMPHOCYTES NFR BLD AUTO: 2.7 /CMM (ref 0.8–4.8)
LYMPHOCYTES NFR BLD AUTO: 24.5 % (ref 20–44)
MCH RBC QN AUTO: 27 PG (ref 26–33)
MCHC RBC AUTO-ENTMCNC: 32 G/DL (ref 31–36)
MCV RBC AUTO: 84 FL (ref 82–100)
MONOCYTES NFR BLD AUTO: 0.3 /CMM (ref 0.1–1.3)
MONOCYTES NFR BLD AUTO: 2.8 % (ref 2–12)
NEUTROPHILS # BLD AUTO: 7.5 /CMM (ref 1.8–8.9)
NEUTROPHILS NFR BLD AUTO: 69.2 % (ref 43–81)
PLATELET # BLD AUTO: 190 /CMM (ref 150–450)
POTASSIUM SERPL-SCNC: 5.1 MMOL/L (ref 3.5–5.1)
RBC # BLD AUTO: 3.81 MIL/UL (ref 4–5.2)
RDW COEFFICIENT OF VARIATION: 14.7 (ref 11.5–15)
SODIUM SERPL-SCNC: 142 MMOL/L (ref 136–145)
WBC NRBC COR # BLD AUTO: 10.9 K/UL (ref 4.3–11)

## 2017-09-12 NOTE — NUR
MS RN NOTES

RECEIVED ON BED A/O X3-4,NO SOB,SKIN WARM AND DRY TO THE TOUCH,PRESENT IVF INFUSING WELL AT 
75ML/HR RATE THRU ROSY CATH VIA IV PUMP.NO PAIN AT THE MOMENT,ISOLATION PRECAUTION FOR MRSA 
NARES AND URINE.ALREADY ON IV ANTIBIOTICS AND BACTROBAN TO NARES.CALL LIGHT IN REACH,NEEDS 
ANTICIPATED.

## 2017-09-12 NOTE — NUR
MS RN NOTES  PAIN MANAGEMENT

C/O ABDOMINAL PAIN 8/10 ON PAIN SCALE.MEDICATED WITH MORPHINE 2MG IV AS ORDERED.WILL MONITOR 
FOR RELIEF.

## 2017-09-12 NOTE — NUR
RN NOTES

RECEIVED NOTIFICATION FROM MICROBIOLOGY PT HAS MRSA IN URINE AND NARES, DR. VILA NOTIFIED 
WITH ORDERS FOR BACTROBAN AND CONTACT ISOLATION, CURRENTLY ON IV ATB, WILL CONTINUE MONITOR, 
CONTACT ISOLATION PRECAUTIONS OBSERVED

## 2017-09-12 NOTE — NUR
RN  NOTES

PATIENT ASLEEP EASILY AROUSABLE DURING CARE.  VERBALLY RESPONSIVE ABLE TO MAKE NEEDS KNOWN. 
RESPIRATIONS EVEN AND UNLABORED, CONTINUES ON PAIN MANAGEMENT WITH EFFECTIVENESS. IV ACCESS 
PATENT AND INTACT NO REDNESS OR INFILTRATION NOTED. PATIENT COMFORTABLE, CALL  LIGHT WITHIN 
EASY REACH, ENDORSED TO NEXT SHIFT FOR CONTINUITY OF CARE

## 2017-09-12 NOTE — NUR
RN NOTES

PATIENT COMPLAINING OF PAIN LEVEL 7/10, OFFERED NORCO PT REFUSED STATES " I WANT TO WAIT FOR 
THE MORPHINE, NORCO MAKES ME NAUSEOUS" EXPLAINED MEDICATION ADMINISTRATION AND PRN ORDERS TO 
PAIN MANAGEMENT VERBALIZES UNDERSTANDING CONTINUES TO STATE "ITS OKAY I WILL WAIT FOR THE 
MORPHINE" WILL CONTINUE TO MONITOR AT THIS TIME

## 2017-09-12 NOTE — NUR
MS RN NOTES

PAIN MANAGEMENT EFFECTIVE,AFEBRILE.OFFERED ONE MORE TIME IV FLUIDS BUT REFUSED.APPLIED 
MEPILEX TO LEFT UPPER BACK.STILL WITH EPISODE OF CONFUSION,NEEDS REORIENTATION EVERY NOW AND 
THEN.SALINE LOCK REMAINS PATENT.CALL LIGHT IN REACH,NEEDS ATTENDED.WILL ENDORSE TO DAY NURSE 
FOR SHAR.

## 2017-09-12 NOTE — NUR
MS RN NOTES  PAIN MANAGEMENT

C/O ABDOMINAL PAIN 8/10 ON PAIN SCALE,MEDICATED WITH MORPHINE 2MG IV AS ORDERED PRN FOR 
SEVERE PAIN.

## 2017-09-12 NOTE — NUR
RN NOTES

MORPHINE ADMINISTERED AS ORDERED, BP NOTED /68 HR 71 WILL CONTINUE TO MONITOR FOR 
EFFECTIVENESS

## 2017-09-12 NOTE — NUR
RN  NOTES

PATIENT AWAKE ALERT AND VERBALLY RESPONSIVE ABLE TO MAKE NEEDS KNOWN. RESPIRATIONS EVEN AND 
UNLABORED, CONTINUES ON PAIN MANAGEMENT WITH EFFECTIVENESS. IV ACCESS PATENT AND INTACT NO 
REDNESS OR INFILTRATION NOTED. PATIENT COMFORTABLE, CALL  LIGHT WITHIN EASY REACH, WILL 
CONTINUE TO MONITOR

## 2017-09-13 VITALS — SYSTOLIC BLOOD PRESSURE: 131 MMHG | DIASTOLIC BLOOD PRESSURE: 71 MMHG

## 2017-09-13 VITALS — SYSTOLIC BLOOD PRESSURE: 137 MMHG | DIASTOLIC BLOOD PRESSURE: 57 MMHG

## 2017-09-13 VITALS — DIASTOLIC BLOOD PRESSURE: 73 MMHG | SYSTOLIC BLOOD PRESSURE: 152 MMHG

## 2017-09-13 LAB
BASOPHILS # BLD AUTO: 0.2 /CMM (ref 0–0.2)
BASOPHILS NFR BLD AUTO: 1.7 % (ref 0–2)
BUN SERPL-MCNC: 33 MG/DL (ref 7–18)
CALCIUM SERPL-MCNC: 7.9 MG/DL (ref 8.5–10.1)
CHLORIDE SERPL-SCNC: 109 MMOL/L (ref 98–107)
CO2 SERPL-SCNC: 21 MMOL/L (ref 21–32)
CREAT SERPL-MCNC: 2.9 MG/DL (ref 0.6–1.3)
EOSINOPHIL # BLD AUTO: 0.3 /CMM (ref 0–0.7)
EOSINOPHIL NFR BLD AUTO: 3.4 % (ref 0–6)
GLUCOSE SERPL-MCNC: 150 MG/DL (ref 74–106)
HCT VFR BLD AUTO: 27 % (ref 33–45)
HGB BLD-MCNC: 8.7 G/DL (ref 11.5–14.8)
LYMPHOCYTES NFR BLD AUTO: 2.2 /CMM (ref 0.8–4.8)
LYMPHOCYTES NFR BLD AUTO: 22.7 % (ref 20–44)
MCH RBC QN AUTO: 27 PG (ref 26–33)
MCHC RBC AUTO-ENTMCNC: 32 G/DL (ref 31–36)
MCV RBC AUTO: 84 FL (ref 82–100)
MONOCYTES NFR BLD AUTO: 0.5 /CMM (ref 0.1–1.3)
MONOCYTES NFR BLD AUTO: 5.1 % (ref 2–12)
NEUTROPHILS # BLD AUTO: 6.6 /CMM (ref 1.8–8.9)
NEUTROPHILS NFR BLD AUTO: 67.1 % (ref 43–81)
PLATELET # BLD AUTO: 151 /CMM (ref 150–450)
POTASSIUM SERPL-SCNC: 4.7 MMOL/L (ref 3.5–5.1)
RBC # BLD AUTO: 3.25 MIL/UL (ref 4–5.2)
RDW COEFFICIENT OF VARIATION: 13.9 (ref 11.5–15)
SODIUM SERPL-SCNC: 139 MMOL/L (ref 136–145)
WBC NRBC COR # BLD AUTO: 9.9 K/UL (ref 4.3–11)

## 2017-09-13 NOTE — NUR
ms/rn notes 



patient complained of lateral back pain.  8/10 levl of pain. medicated with morphine 2mg ivp 
as ordered. will cont. to monitor.

## 2017-09-13 NOTE — NUR
RN  NOTES

PATIENT ASLEEP AND EASILY AROUSABLE DURING CARE, VERBALLY RESPONSIVE ABLE TO MAKE NEEDS 
KNOWN. RESPIRATIONS EVEN AND UNLABORED, CONTINUES ON PAIN MANAGEMENT WITH EFFECTIVENESS. IV 
ACCESS PATENT AND INTACT NO REDNESS OR INFILTRATION NOTED. PATIENT COMFORTABLE, CALL  LIGHT 
WITHIN EASY REACH, WILL CONTINUE TO MONITOR

## 2017-09-13 NOTE — NUR
LVN/NOTES

 BLOOD SUGAR CHECKED DONE 110 , 20 UNITS OF LEVEMIR GIVEN ARABELLA SQ AS ORDERED,NO SIGNS OF 
HYPO/HYPER GLYCEMIA NOTED SNACKS ALSO SERVED. PT STILL WITH IVF OF 1/2 NS AT 75ML/HR. WILL 
CONTINUE TO MONITOR.

## 2017-09-13 NOTE — NUR
MS LVN INITIAL NOTES

 RECEIVED REPORT FROM AM NURSE MUKUND/RN, CHECKED PT , SHE'S AWAKE AND ALERT WITH  AT 
THE BEDSIDE TO DRAW BLOOD FOR VANCO TROUGH. IN ROOM AIR NO SIGNS OF ANY ACUTE DISTRESS 
NOTED. PATIENT ASKING IF HER PAIN MEDS DUE, SPOKE TO HER THAT I WILL CHECK AND I'LL BE BACK. 
KEPT HER WARM AND COMFORTABLE AT ALL TIMES. ISOLATION PRECAUTION IMPLEMENTED AND OBSERVED. 
WILL CONTINUE TO MONITOR. PLACE CALL LIGHT AT REACH.

## 2017-09-14 VITALS — SYSTOLIC BLOOD PRESSURE: 126 MMHG | DIASTOLIC BLOOD PRESSURE: 68 MMHG

## 2017-09-14 VITALS — DIASTOLIC BLOOD PRESSURE: 68 MMHG | SYSTOLIC BLOOD PRESSURE: 126 MMHG

## 2017-09-14 LAB
BASOPHILS # BLD AUTO: 0 /CMM (ref 0–0.2)
BASOPHILS NFR BLD AUTO: 0.4 % (ref 0–2)
BUN SERPL-MCNC: 30 MG/DL (ref 7–18)
CALCIUM SERPL-MCNC: 8.3 MG/DL (ref 8.5–10.1)
CHLORIDE SERPL-SCNC: 110 MMOL/L (ref 98–107)
CO2 SERPL-SCNC: 22 MMOL/L (ref 21–32)
CREAT SERPL-MCNC: 3.1 MG/DL (ref 0.6–1.3)
EOSINOPHIL # BLD AUTO: 0.5 /CMM (ref 0–0.7)
EOSINOPHIL NFR BLD AUTO: 5 % (ref 0–6)
GLUCOSE SERPL-MCNC: 115 MG/DL (ref 74–106)
HCT VFR BLD AUTO: 33 % (ref 33–45)
HGB BLD-MCNC: 10.4 G/DL (ref 11.5–14.8)
LYMPHOCYTES NFR BLD AUTO: 1.2 /CMM (ref 0.8–4.8)
LYMPHOCYTES NFR BLD AUTO: 13.3 % (ref 20–44)
MCH RBC QN AUTO: 27 PG (ref 26–33)
MCHC RBC AUTO-ENTMCNC: 31 G/DL (ref 31–36)
MCV RBC AUTO: 85 FL (ref 82–100)
MONOCYTES NFR BLD AUTO: 0.2 /CMM (ref 0.1–1.3)
MONOCYTES NFR BLD AUTO: 2.5 % (ref 2–12)
NEUTROPHILS # BLD AUTO: 7.2 /CMM (ref 1.8–8.9)
NEUTROPHILS NFR BLD AUTO: 78.8 % (ref 43–81)
PLATELET # BLD AUTO: 147 /CMM (ref 150–450)
POTASSIUM SERPL-SCNC: 4.9 MMOL/L (ref 3.5–5.1)
RBC # BLD AUTO: 3.93 MIL/UL (ref 4–5.2)
RDW COEFFICIENT OF VARIATION: 14 (ref 11.5–15)
SODIUM SERPL-SCNC: 140 MMOL/L (ref 136–145)
WBC NRBC COR # BLD AUTO: 9.1 K/UL (ref 4.3–11)

## 2017-09-14 NOTE — NUR
ms/rn notes



patiient complained of gen. pain, medicated with morphine 2 mg ivp  and Benadryl ivp for 
itching. will continue to monitor.

## 2017-09-14 NOTE — NUR
MS RN OPENING NOTES



RECEIVED PT FROM NIGHTSHIFT NURSE IN STABLE CONDITION. PT IS A/O X4. NO SOB OR SIGNS OF 
DISTRESS NOTED. BREATHING IS EVEN AND UNLABORED. PT DENIES ANY PAIN, CHILLS, OR FEVER AT 
THIS TIME. PT REMAINED AFEBRILE DURING THE NIGHT. RIGHT CHEST WALL PORT-A-CATH INTACT AND 
PATENT. PT IS CURRENTLY RECEIVING AN INFUSION OF 1/2 NS @ 75ML/HR. PT IS TOLERATING INFUSION 
WELL. NO REDNESS OR SIGNS OF INFILTRATION NOTED. BED IN LOW LOCKED POSITION, SIDE RAILS UP 
X2, CALL LIGHT WITHIN REACH, CONTACT ISOLATION PRECAUTIONS MAINTAINED. WILL CONTINUE TO 
MONITOR.

## 2017-09-14 NOTE — NUR
MS LVN CLOSING NOTES

 PT REMAINS RESTING WITH IVF INFUSING AT THIS TIME. NO SIGNS OF ANY ACUTE DISTRESS NOTED. 
ALL DUE MEDS GIVEN AND ALL NEEDS MET. KEPT HER WARM AND COMFORTABLE AT ALL TIMES. SLEPT WELL 
AFTER PAIN MEDS GIVEN. STABLE ARABELLA THE NIGHT EXCEPT THE PAIN ON AND OFF. ENDORSE TO AM NURSE 
FOR CONTINUITY OF CARE.

## 2017-09-14 NOTE — NUR
ms/rn notes 



patient complained of lateral back pain.  7/10 level of pain. medicated with morphine 2mg 
ivp as ordered. will cont. to monitor.

## 2017-09-14 NOTE — NUR
LVN/NOTES

 CHECKED PT SLEEPING COMFORTABLY IN BED AFTER PAIN MEDICATION GIVEN, BREATHING EVEN AND 
NON-LABORED. IVF STILL INFUSING. KEPT HER WARM AND COMFORTABLE AT ALL TIMES. WILL CONTINUE 
TO MONITOR,.PLACE CALL LIGHT AT REACH,

## 2017-09-14 NOTE — NUR
MS RN DISCHARGE NOTES



PT WAS DISCHARGED FROM UNIT IN STABLE CONDITION. ALL NEEDS WERE MET DURING SHIFT AND ORDERS 
CARRIED OUT ACCORDINGLY. ALL DISCHARGE INSTRUCTIONS REVIEWED WITH PATIENT. PT VERBALIZED 
UNDERSTANDING OF ALL DISCHARGE INSTRUCTIONS. CASE MANAGEMENT WAS CALLED IN REGARDS TO HOME 
HEALTH SET UP. PER PAMELA BROCK , THE PT'S INSURANCE COMPANY WILL SET UP HOME 
HEALTH. PT WAS GIVEN HER WRITTEN PRESCRIPTION. ALL BELONGINGS WERE SENT HOME WITH PT. ALL 
DISCHARGE DOCUMENTS WERE SIGNED BY PATIENT. PT LEFT HOSPITAL VIA [facility] ACCOMPANIED BY HER 
. ZEE WATCHED AS THE PATIENT SAFELY GOT IN THE CAR AND THE  DRIVE OFF.

## 2017-09-18 ENCOUNTER — HOSPITAL ENCOUNTER (EMERGENCY)
Dept: HOSPITAL 54 - ER | Age: 62
Discharge: HOME | End: 2017-09-18
Payer: MEDICAID

## 2017-09-18 VITALS — SYSTOLIC BLOOD PRESSURE: 141 MMHG | DIASTOLIC BLOOD PRESSURE: 78 MMHG

## 2017-09-18 VITALS — WEIGHT: 180 LBS | HEIGHT: 65 IN | BODY MASS INDEX: 29.99 KG/M2

## 2017-09-18 DIAGNOSIS — Z88.8: ICD-10-CM

## 2017-09-18 DIAGNOSIS — I12.9: ICD-10-CM

## 2017-09-18 DIAGNOSIS — E11.22: ICD-10-CM

## 2017-09-18 DIAGNOSIS — D64.9: ICD-10-CM

## 2017-09-18 DIAGNOSIS — Z79.4: ICD-10-CM

## 2017-09-18 DIAGNOSIS — Z85.528: ICD-10-CM

## 2017-09-18 DIAGNOSIS — N39.0: Primary | ICD-10-CM

## 2017-09-18 DIAGNOSIS — N18.9: ICD-10-CM

## 2017-09-18 DIAGNOSIS — Z88.0: ICD-10-CM

## 2017-09-18 LAB
APPEARANCE UR: (no result)
BASOPHILS # BLD AUTO: 0.2 /CMM (ref 0–0.2)
BASOPHILS NFR BLD AUTO: 1.7 % (ref 0–2)
BILIRUB UR QL STRIP: NEGATIVE
BUN SERPL-MCNC: 25 MG/DL (ref 7–18)
CALCIUM SERPL-MCNC: 8.5 MG/DL (ref 8.5–10.1)
CHLORIDE SERPL-SCNC: 108 MMOL/L (ref 98–107)
CO2 SERPL-SCNC: 25 MMOL/L (ref 21–32)
COLOR UR: YELLOW
CREAT SERPL-MCNC: 2.6 MG/DL (ref 0.6–1.3)
EOSINOPHIL # BLD AUTO: 0.3 /CMM (ref 0–0.7)
EOSINOPHIL NFR BLD AUTO: 2.5 % (ref 0–6)
GLUCOSE SERPL-MCNC: 170 MG/DL (ref 74–106)
GLUCOSE UR STRIP-MCNC: (no result) MG/DL
HCT VFR BLD AUTO: 30 % (ref 33–45)
HGB BLD-MCNC: 9.8 G/DL (ref 11.5–14.8)
HGB UR QL STRIP: (no result) ERY/UL
KETONES UR STRIP-MCNC: NEGATIVE MG/DL
LEUKOCYTE ESTERASE UR QL STRIP: (no result)
LYMPHOCYTES NFR BLD AUTO: 1.8 /CMM (ref 0.8–4.8)
LYMPHOCYTES NFR BLD AUTO: 17.2 % (ref 20–44)
MCH RBC QN AUTO: 28 PG (ref 26–33)
MCHC RBC AUTO-ENTMCNC: 33 G/DL (ref 31–36)
MCV RBC AUTO: 83 FL (ref 82–100)
MONOCYTES NFR BLD AUTO: 1 /CMM (ref 0.1–1.3)
MONOCYTES NFR BLD AUTO: 9.3 % (ref 2–12)
NEUTROPHILS # BLD AUTO: 7.1 /CMM (ref 1.8–8.9)
NEUTROPHILS NFR BLD AUTO: 69.3 % (ref 43–81)
NITRITE UR QL STRIP: NEGATIVE
PH UR STRIP: 6.5 [PH] (ref 5–8)
PLATELET # BLD AUTO: 396 /CMM (ref 150–450)
POTASSIUM SERPL-SCNC: 4.7 MMOL/L (ref 3.5–5.1)
PROT UR QL STRIP: (no result) MG/DL
RBC # BLD AUTO: 3.56 MIL/UL (ref 4–5.2)
RBC #/AREA URNS HPF: (no result) /HPF (ref 0–2)
RDW COEFFICIENT OF VARIATION: 13.2 (ref 11.5–15)
SODIUM SERPL-SCNC: 140 MMOL/L (ref 136–145)
UROBILINOGEN UR STRIP-MCNC: 0.2 EU/DL
WBC NRBC COR # BLD AUTO: 10.4 K/UL (ref 4.3–11)

## 2017-09-18 PROCEDURE — A4606 OXYGEN PROBE USED W OXIMETER: HCPCS

## 2017-09-18 PROCEDURE — Z7610: HCPCS

## 2018-05-17 ENCOUNTER — HOSPITAL ENCOUNTER (EMERGENCY)
Dept: HOSPITAL 54 - ER | Age: 63
LOS: 1 days | Discharge: HOME | End: 2018-05-18
Payer: MEDICAID

## 2018-05-17 VITALS — HEIGHT: 65 IN | WEIGHT: 180 LBS | BODY MASS INDEX: 29.99 KG/M2

## 2018-05-17 DIAGNOSIS — Z88.0: ICD-10-CM

## 2018-05-17 DIAGNOSIS — K57.92: ICD-10-CM

## 2018-05-17 DIAGNOSIS — Z99.2: ICD-10-CM

## 2018-05-17 DIAGNOSIS — Z85.528: ICD-10-CM

## 2018-05-17 DIAGNOSIS — N39.0: Primary | ICD-10-CM

## 2018-05-17 DIAGNOSIS — I12.0: ICD-10-CM

## 2018-05-17 DIAGNOSIS — N18.6: ICD-10-CM

## 2018-05-17 DIAGNOSIS — Z79.4: ICD-10-CM

## 2018-05-17 DIAGNOSIS — Z88.8: ICD-10-CM

## 2018-05-17 DIAGNOSIS — Z90.5: ICD-10-CM

## 2018-05-17 DIAGNOSIS — E11.22: ICD-10-CM

## 2018-05-17 DIAGNOSIS — K57.30: ICD-10-CM

## 2018-05-17 DIAGNOSIS — Z88.1: ICD-10-CM

## 2018-05-17 DIAGNOSIS — Z90.49: ICD-10-CM

## 2018-05-17 PROCEDURE — A4216 STERILE WATER/SALINE, 10 ML: HCPCS

## 2018-05-17 PROCEDURE — Z7610: HCPCS

## 2018-05-17 PROCEDURE — A4606 OXYGEN PROBE USED W OXIMETER: HCPCS

## 2018-05-17 NOTE — NUR
BB SELF; LEFT FLANK PAIN X 3 DAYS WITH NAUSEA/ VOMIT WITH A COUGH. VSS NAD. 
PORTACATH IN PLACE RIGHT CHEST WALL. A/OX4 ABLE TO MAKE NEEDS KNOWN. WILL 
CONTINUE TO MONITOR FOR ANY CHANGES DURING THE SHIFT.

## 2018-05-18 VITALS — DIASTOLIC BLOOD PRESSURE: 78 MMHG | SYSTOLIC BLOOD PRESSURE: 124 MMHG

## 2018-05-18 LAB
ALBUMIN SERPL BCP-MCNC: 3.1 G/DL (ref 3.4–5)
ALP SERPL-CCNC: 120 U/L (ref 46–116)
ALT SERPL W P-5'-P-CCNC: 19 U/L (ref 12–78)
APPEARANCE UR: CLEAR
APTT PPP: 24 SEC (ref 23–34)
AST SERPL W P-5'-P-CCNC: 15 U/L (ref 15–37)
BASOPHILS # BLD AUTO: 0 /CMM (ref 0–0.2)
BASOPHILS NFR BLD AUTO: 0.3 % (ref 0–2)
BILIRUB DIRECT SERPL-MCNC: 0 MG/DL (ref 0–0.2)
BILIRUB SERPL-MCNC: 0.1 MG/DL (ref 0.2–1)
BILIRUB UR QL STRIP: NEGATIVE
BUN SERPL-MCNC: 49 MG/DL (ref 7–18)
CALCIUM SERPL-MCNC: 8.6 MG/DL (ref 8.5–10.1)
CHLORIDE SERPL-SCNC: 109 MMOL/L (ref 98–107)
CO2 SERPL-SCNC: 21 MMOL/L (ref 21–32)
COLOR UR: YELLOW
CREAT SERPL-MCNC: 3.5 MG/DL (ref 0.6–1.3)
EOSINOPHIL NFR BLD AUTO: 2.5 % (ref 0–6)
GLUCOSE SERPL-MCNC: 147 MG/DL (ref 74–106)
GLUCOSE UR STRIP-MCNC: (no result) MG/DL
HCT VFR BLD AUTO: 28 % (ref 33–45)
HGB BLD-MCNC: 9.4 G/DL (ref 11.5–14.8)
HGB UR QL STRIP: (no result) ERY/UL
INR PPP: 0.9 (ref 0.87–1.13)
KETONES UR STRIP-MCNC: NEGATIVE MG/DL
LEUKOCYTE ESTERASE UR QL STRIP: NEGATIVE
LIPASE SERPL-CCNC: 79 U/L (ref 73–393)
LYMPHOCYTES NFR BLD AUTO: 3.7 /CMM (ref 0.8–4.8)
LYMPHOCYTES NFR BLD AUTO: 33.2 % (ref 20–44)
MCHC RBC AUTO-ENTMCNC: 33 G/DL (ref 31–36)
MCV RBC AUTO: 86 FL (ref 82–100)
MONOCYTES NFR BLD AUTO: 0.8 /CMM (ref 0.1–1.3)
MONOCYTES NFR BLD AUTO: 7.7 % (ref 2–12)
NEUTROPHILS # BLD AUTO: 6.2 /CMM (ref 1.8–8.9)
NEUTROPHILS NFR BLD AUTO: 56.3 % (ref 43–81)
NITRITE UR QL STRIP: NEGATIVE
PH UR STRIP: 5.5 [PH] (ref 5–8)
PLATELET # BLD AUTO: 278 /CMM (ref 150–450)
POTASSIUM SERPL-SCNC: 5.4 MMOL/L (ref 3.5–5.1)
PROT SERPL-MCNC: 7.3 G/DL (ref 6.4–8.2)
PROT UR QL STRIP: (no result) MG/DL
RBC # BLD AUTO: 3.27 MIL/UL (ref 4–5.2)
RBC #/AREA URNS HPF: (no result) /HPF (ref 0–2)
RDW COEFFICIENT OF VARIATION: 13.7 (ref 11.5–15)
SODIUM SERPL-SCNC: 142 MMOL/L (ref 136–145)
UROBILINOGEN UR STRIP-MCNC: 0.2 EU/DL
WBC #/AREA URNS HPF: (no result) /HPF
WBC #/AREA URNS HPF: (no result) /HPF (ref 0–3)
WBC NRBC COR # BLD AUTO: 11.1 K/UL (ref 4.3–11)

## 2018-05-18 NOTE — NUR
ATB BEING INFUSED CURRENTLY. NO ADVERSE SIDE EFFECTS NOTED. WILL CONTINUE TO 
MONITOR FOR ANY CHANGES

## 2022-11-02 ENCOUNTER — HOSPITAL ENCOUNTER (EMERGENCY)
Dept: HOSPITAL 54 - ER | Age: 67
LOS: 1 days | Discharge: HOME | End: 2022-11-03
Payer: MEDICAID

## 2022-11-02 VITALS — BODY MASS INDEX: 28.16 KG/M2 | WEIGHT: 169 LBS | HEIGHT: 65 IN

## 2022-11-02 DIAGNOSIS — I12.0: ICD-10-CM

## 2022-11-02 DIAGNOSIS — Z99.2: ICD-10-CM

## 2022-11-02 DIAGNOSIS — R94.31: ICD-10-CM

## 2022-11-02 DIAGNOSIS — Z90.5: ICD-10-CM

## 2022-11-02 DIAGNOSIS — Z88.1: ICD-10-CM

## 2022-11-02 DIAGNOSIS — Z85.528: ICD-10-CM

## 2022-11-02 DIAGNOSIS — N18.6: ICD-10-CM

## 2022-11-02 DIAGNOSIS — Z91.041: ICD-10-CM

## 2022-11-02 DIAGNOSIS — R10.9: Primary | ICD-10-CM

## 2022-11-02 DIAGNOSIS — E87.5: ICD-10-CM

## 2022-11-02 DIAGNOSIS — M54.50: ICD-10-CM

## 2022-11-02 DIAGNOSIS — Z90.49: ICD-10-CM

## 2022-11-02 DIAGNOSIS — Z20.822: ICD-10-CM

## 2022-11-02 DIAGNOSIS — Z91.15: ICD-10-CM

## 2022-11-02 DIAGNOSIS — Z79.899: ICD-10-CM

## 2022-11-02 DIAGNOSIS — E11.22: ICD-10-CM

## 2022-11-02 PROCEDURE — 80076 HEPATIC FUNCTION PANEL: CPT

## 2022-11-02 PROCEDURE — 85025 COMPLETE CBC W/AUTO DIFF WBC: CPT

## 2022-11-02 PROCEDURE — 99285 EMERGENCY DEPT VISIT HI MDM: CPT

## 2022-11-02 PROCEDURE — 83690 ASSAY OF LIPASE: CPT

## 2022-11-02 PROCEDURE — 80048 BASIC METABOLIC PNL TOTAL CA: CPT

## 2022-11-02 PROCEDURE — 71045 X-RAY EXAM CHEST 1 VIEW: CPT

## 2022-11-02 PROCEDURE — C9803 HOPD COVID-19 SPEC COLLECT: HCPCS

## 2022-11-02 PROCEDURE — 87426 SARSCOV CORONAVIRUS AG IA: CPT

## 2022-11-02 PROCEDURE — 85730 THROMBOPLASTIN TIME PARTIAL: CPT

## 2022-11-02 PROCEDURE — 36415 COLL VENOUS BLD VENIPUNCTURE: CPT

## 2022-11-02 PROCEDURE — 96374 THER/PROPH/DIAG INJ IV PUSH: CPT

## 2022-11-02 PROCEDURE — 93005 ELECTROCARDIOGRAM TRACING: CPT

## 2022-11-02 PROCEDURE — 74176 CT ABD & PELVIS W/O CONTRAST: CPT

## 2022-11-02 PROCEDURE — 81001 URINALYSIS AUTO W/SCOPE: CPT

## 2022-11-02 PROCEDURE — 84484 ASSAY OF TROPONIN QUANT: CPT

## 2022-11-02 NOTE — NUR
IV CANNULA G24 INSERTED ON RIGHT HAND. ABLE TO COLLECT SPECIMEN FOR BMP. PT IS 
HARDSTICK. RN SUPERVISOR NIURKA MADE AWARE THAT PT NEEDS MIDLINE INSERTION. 
DR ARMENTA MADE AWARE

## 2022-11-02 NOTE — NUR
BIBS C/O L SIDED LOWER BACK PAIN AND N/V. STATES SHE MISSED HER LAST FEW 
DIALYSIS DUE TO HAVING FLU LIKE SYMPTOMS LAST WEEK LAST DIALYSIS RECIEVED ON 
TUESDAY 10/25. PT AWAKE AND ALERT X4 BREATHING UNLABORED CHANGED INTO GOWN AND 
PLACED ON MONITOR AND V/S WNL.

## 2022-11-02 NOTE — NUR
BELL MACHADO ATTEMPTED TO INSERT A HUB TO PATIENTS PORTACATH SEVERAL TIMES BUT 
FAILED. DR ARMENTA AGREED TO ORDER MIDLINE FOR PATIENT.

## 2022-11-03 VITALS — DIASTOLIC BLOOD PRESSURE: 99 MMHG | SYSTOLIC BLOOD PRESSURE: 144 MMHG

## 2022-11-03 LAB
ALBUMIN SERPL BCP-MCNC: 3.2 G/DL (ref 3.4–5)
ALP SERPL-CCNC: 141 U/L (ref 46–116)
ALT SERPL W P-5'-P-CCNC: 15 U/L (ref 12–78)
AST SERPL W P-5'-P-CCNC: 12 U/L (ref 15–37)
BASOPHILS # BLD AUTO: 0.1 K/UL (ref 0–0.2)
BASOPHILS NFR BLD AUTO: 0.6 % (ref 0–2)
BILIRUB DIRECT SERPL-MCNC: 0.1 MG/DL (ref 0–0.2)
BILIRUB SERPL-MCNC: 0.3 MG/DL (ref 0.2–1)
BILIRUB UR QL STRIP: NEGATIVE
BUN SERPL-MCNC: 54 MG/DL (ref 7–18)
BUN SERPL-MCNC: 56 MG/DL (ref 7–18)
CALCIUM SERPL-MCNC: 8.8 MG/DL (ref 8.5–10.1)
CALCIUM SERPL-MCNC: 8.8 MG/DL (ref 8.5–10.1)
CHLORIDE SERPL-SCNC: 111 MMOL/L (ref 98–107)
CHLORIDE SERPL-SCNC: 111 MMOL/L (ref 98–107)
CO2 SERPL-SCNC: 13 MMOL/L (ref 21–32)
CO2 SERPL-SCNC: 18 MMOL/L (ref 21–32)
COLOR UR: YELLOW
CREAT SERPL-MCNC: 5.7 MG/DL (ref 0.6–1.3)
CREAT SERPL-MCNC: 5.7 MG/DL (ref 0.6–1.3)
EOSINOPHIL NFR BLD AUTO: 1.4 % (ref 0–6)
GLUCOSE SERPL-MCNC: 113 MG/DL (ref 74–106)
GLUCOSE SERPL-MCNC: 120 MG/DL (ref 74–106)
GLUCOSE UR STRIP-MCNC: (no result) MG/DL
HCT VFR BLD AUTO: 31 % (ref 33–45)
HGB BLD-MCNC: 10.2 G/DL (ref 11.5–14.8)
LEUKOCYTE ESTERASE UR QL STRIP: NEGATIVE
LIPASE SERPL-CCNC: 45 U/L (ref 73–393)
LYMPHOCYTES NFR BLD AUTO: 4.5 K/UL (ref 0.8–4.8)
LYMPHOCYTES NFR BLD AUTO: 47.6 % (ref 20–44)
MCHC RBC AUTO-ENTMCNC: 33 G/DL (ref 31–36)
MCV RBC AUTO: 90 FL (ref 82–100)
MONOCYTES NFR BLD AUTO: 0.6 K/UL (ref 0.1–1.3)
MONOCYTES NFR BLD AUTO: 6.1 % (ref 2–12)
NEUTROPHILS # BLD AUTO: 4.2 K/UL (ref 1.8–8.9)
NEUTROPHILS NFR BLD AUTO: 44.3 % (ref 43–81)
NITRITE UR QL STRIP: NEGATIVE
PH UR STRIP: 7 [PH] (ref 5–8)
PLATELET # BLD AUTO: 169 K/UL (ref 150–450)
POTASSIUM SERPL-SCNC: 5.4 MMOL/L (ref 3.5–5.1)
POTASSIUM SERPL-SCNC: 5.7 MMOL/L (ref 3.5–5.1)
PROT SERPL-MCNC: 7 G/DL (ref 6.4–8.2)
PROT UR QL STRIP: >=300 MG/DL
RBC # BLD AUTO: 3.48 MIL/UL (ref 4–5.2)
RBC #/AREA URNS HPF: (no result) /HPF (ref 0–2)
SODIUM SERPL-SCNC: 134 MMOL/L (ref 136–145)
SODIUM SERPL-SCNC: 140 MMOL/L (ref 136–145)
UROBILINOGEN UR STRIP-MCNC: 0.2 EU/DL
WBC #/AREA URNS HPF: (no result) /HPF (ref 0–3)
WBC NRBC COR # BLD AUTO: 9.4 K/UL (ref 4.3–11)

## 2022-11-07 NOTE — NUR
RN  NOTES

CALLED PHARMACY FOR EPOGEN ORDER, PER PHARMACY NEED CBC RESULTS, WILL CALL LAB PT WITH 
ORDERS FOR TODAY 07-Nov-2022 20:41